# Patient Record
Sex: FEMALE | Race: ASIAN | NOT HISPANIC OR LATINO | Employment: UNEMPLOYED | URBAN - METROPOLITAN AREA
[De-identification: names, ages, dates, MRNs, and addresses within clinical notes are randomized per-mention and may not be internally consistent; named-entity substitution may affect disease eponyms.]

---
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
## 2020-02-13 ENCOUNTER — COMMUNICATION - HEALTHEAST (OUTPATIENT)
Dept: FAMILY MEDICINE | Facility: CLINIC | Age: 20
End: 2020-02-13

## 2020-03-05 ENCOUNTER — MYC REFILL (OUTPATIENT)
Dept: FAMILY MEDICINE | Facility: CLINIC | Age: 20
End: 2020-03-05

## 2020-03-05 DIAGNOSIS — N97.0 ANOVULATION: ICD-10-CM

## 2020-03-09 ENCOUNTER — OFFICE VISIT (OUTPATIENT)
Dept: PSYCHOLOGY | Facility: CLINIC | Age: 20
End: 2020-03-09
Payer: COMMERCIAL

## 2020-03-09 ENCOUNTER — OFFICE VISIT (OUTPATIENT)
Dept: FAMILY MEDICINE | Facility: CLINIC | Age: 20
End: 2020-03-09
Payer: COMMERCIAL

## 2020-03-09 VITALS
RESPIRATION RATE: 18 BRPM | SYSTOLIC BLOOD PRESSURE: 104 MMHG | DIASTOLIC BLOOD PRESSURE: 69 MMHG | OXYGEN SATURATION: 99 % | BODY MASS INDEX: 33.79 KG/M2 | TEMPERATURE: 98.9 F | WEIGHT: 179 LBS | HEART RATE: 70 BPM | HEIGHT: 61 IN

## 2020-03-09 DIAGNOSIS — E66.811 CLASS 1 OBESITY DUE TO EXCESS CALORIES WITHOUT SERIOUS COMORBIDITY WITH BODY MASS INDEX (BMI) OF 33.0 TO 33.9 IN ADULT: ICD-10-CM

## 2020-03-09 DIAGNOSIS — E66.09 CLASS 1 OBESITY DUE TO EXCESS CALORIES WITHOUT SERIOUS COMORBIDITY WITH BODY MASS INDEX (BMI) OF 33.0 TO 33.9 IN ADULT: ICD-10-CM

## 2020-03-09 DIAGNOSIS — F41.8 OTHER SPECIFIED ANXIETY DISORDERS: Primary | ICD-10-CM

## 2020-03-09 DIAGNOSIS — E11.9 TYPE 2 DIABETES MELLITUS WITHOUT COMPLICATION, WITHOUT LONG-TERM CURRENT USE OF INSULIN (H): Primary | ICD-10-CM

## 2020-03-09 DIAGNOSIS — F41.8 OTHER SPECIFIED ANXIETY DISORDERS: ICD-10-CM

## 2020-03-09 LAB — HBA1C MFR BLD: 5.7 % (ref 4.1–5.7)

## 2020-03-09 RX ORDER — NORGESTIMATE AND ETHINYL ESTRADIOL 7DAYSX3 LO
1 KIT ORAL DAILY
Qty: 90 TABLET | Refills: 1 | Status: SHIPPED | OUTPATIENT
Start: 2020-03-09 | End: 2020-10-07

## 2020-03-09 ASSESSMENT — MIFFLIN-ST. JEOR: SCORE: 1524.32

## 2020-03-09 NOTE — RESULT ENCOUNTER NOTE
Released in [employer]:  Bee,  Your A1c is stable, so we can hold off on restarting the metformin for now. Keep up the exercise and weight loss! Good luck the rest of the semester.  -Dr. Montiel

## 2020-03-09 NOTE — PROGRESS NOTES
Behavioral Health Progress Note    Client Legal Name: Jackelin Quinones   Client Preferred Name: Jackelin   Service Type: Individual  Length of Visit: 55 minutes  Attendees: n/a       Identifying Information and Presenting Problem:    The patient is a 19 year old Haylee female who is being seen for problematic symptoms of anxiety.    Treatment Objective(s) Addressed in This Session:  Anxiety: will develop more effective coping skills to manage anxiety symptoms      Progress on / Status of Treatment Objective(s) / Homework:  Satisfactory progress       PHQ-9 SCORE 3/15/2019   PHQ-9 Total Score 13       KEY-7 SCORE 3/15/2019   Total Score 11       Topics Discussed/Interventions Provided:  Patient reports she had a panic attack approx 2 weekends ago at college. She is back in town on spring break and wanted to process through it with me to get idea on how to handle the after effects. Patient has not had an attack since last summer and reports having adjusted to college life well; describes a good social group, doing well in classes, and finding a good schedule for herself. She has implemented some of the anxiety management techniques we discussed in previous appointments (e.g., letting go, prioritizing, progressive muscle relaxation, being social and active, deep breathing) and has not needed to attend psychotherapy at college. Remainder of appointment spent exploring this recent panic attack that seems to have arisen from a number of stressors that piled up quickly. Discussed her management of the attack and answered questions about post attack self-care. I suggested light exercise, prioritizing, self-compassion, and perhaps use of a weighted blanket. Patient unlikely to return to therapy at this time per her report, she simply wanted to process this latest panic attack and plan for the future.     Assessment: The patient appeared to be active and engaged in today's session and was receptive to feedback. Patient's baseline anxiety  seems to have decreased significantly; is proactively engaging in methods to manage her anxiety and has done well in her transition to college. Since this panic attack was the first in at least 6-7 months, patient needed to discuss/some reassurance of how to manage in the future.     Mental Status: Bee appeared generally alert and oriented. Dress was casual and appropriate to the weather and occasion. Grooming and hygiene were clean. Eye contact was good. Speech was of normal volume and rate and was clear, coherent, and relevant. Mood was bright with congruent affect. Thought processes were relevant, logical and goal-directed. Thought content was WNL with no evidence of psychotic or paranoid features. No evidence of SI/HI or self-harm, intent, or plans. Memory appeared grossly intact. Insight and judgment appeared good and patient exhibited good impulse control during the appointment.     Does the patient appear to be at imminent risk of harm to self/others at this time? No        The session was necessary to address anxiety that have been interfering with patient's ability to function at accomplishing tasks on time, procrastinating on work that has led to problems at school, overthinking.  Ongoing psychotherapy is necessary to improve functioning with daily activities, provide psychoeducation and provide support.     Diagnosis (DSM-5):  300.09 other specified anxiety disorder    Plan:  1. Follow up PRN  2. Treatment plan not updated today as patient does not anticipate further appointments with me. If she decides to re-establish care, her treatment plan will need to be reviewed and updated.     NOTE: Treatment plan update due next appt if patient decides to re-establish care Diagnostic assessment update due 4/9/20

## 2020-03-09 NOTE — PROGRESS NOTES
Preceptor Attestation:   Patient seen, evaluated and discussed with the resident. I have verified the content of the note, which accurately reflects my assessment of the patient and the plan of care.   Supervising Physician:  Tucker Sexton MD

## 2020-03-09 NOTE — PROGRESS NOTES
"Assessment and Plan     1. Type 2 diabetes mellitus without complication, without long-term current use of insulin (H)  Sugars have been relatively well controlled per patient report even after stopping metformin - previous A1c 5.6 in October 2019. Stable at 5.7 today! Will continue to hold metformin and encouraged continuing healthy habits as below.   -DM foot exam as below    2. Class 1 obesity due to excess calories without serious comorbidity with body mass index (BMI) of 33.0 to 33.9 in adult  Weight is down 5lbs since I saw her last fall! Applauded her efforts. Encouraged continuing healthy habits. She declines resources.     3. Other specified anxiety disorders  Feels this is well controlled. She seems to understand her provoking factors - stress from exams being one of them so understands she should have a therapist in [place] to help her cope during these times. She declines resources from clinic today. Does not want any medications.     Follow up with [facility] as scheduled, otherwise for physical when return for summer break.  Options for treatment and follow-up care were reviewed with the patient and/or guardian. Jackelin Quinones and/or guardian engaged in the decision making process and verbalized understanding of the options discussed and agreed with the final plan.    Neil Montiel MD   [facility]  Pager #: [phone]    Precepted today with: Dr. 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 SPANGLER later today  Plans to have more regular visits in the summer  She was going to try to find a therapist in [place], but never followed up  School provokes her symptoms  Not interested in medications    Refilled her OCP today on refill encounter    Obesity:  Has been exercising at least 3x per week  Playing a lot of volleyball  Likes the elliptical  Plans to do more cardio            PMHX:     Patient Active Problem List   Diagnosis     Diabetes mellitus, type 2 (H)     Other specified anxiety disorders     Class 1 obesity due to excess calories without serious comorbidity with body mass index (BMI) of 34.0 to 34.9 in adult       Current Outpatient Medications   Medication Sig Dispense Refill     blood glucose (NO BRAND SPECIFIED) lancets standard Use to test blood sugar 1 times daily or as directed. 100 each 11     blood glucose (NO BRAND SPECIFIED) test strip Use to test blood sugar 1 times daily or as directed. 100 strip 11     blood glucose monitoring (NO BRAND SPECIFIED) meter device kit Use to test blood sugar 1 times daily or as directed. 1 kit 0     norgestim-eth estrad triphasic (ORTHO TRI-CYCLEN LO) 0.18/0.215/0.25 MG-25 MCG tablet Take 1 tablet by mouth daily 90 tablet 1     metFORMIN (GLUCOPHAGE-XR) 500 MG 24 hr tablet Take 2 tablets (1,000 mg) by mouth daily (with dinner) (Patient not taking: Reported on 3/9/2020) 30 tablet 3       Social History     Tobacco Use     Smoking status: Never Smoker     Smokeless tobacco: Never Used   Substance Use Topics     Alcohol use: No     Alcohol/week: 0.0 standard drinks     Drug use: No          No Known Allergies    No results found for this or any previous visit (from the past 24 hour(s)).         Review of Systems:   C: NEGATIVE for fatigue, unexpected change in weight  E: NEGATIVE for acute vision problems or changes  R: NEGATIVE for significant cough or shortness of breath  CV: NEGATIVE for chest pain, palpitations or new or worsening peripheral edema  P:  "NEGATIVE for changes in mood or affect     Complete ROS negative unless noted above or in HPI       Physical Exam:     Vitals:    03/09/20 1303   BP: 104/69   Pulse: 70   Resp: 18   Temp: 98.9  F (37.2  C)   TempSrc: Oral   SpO2: 99%   Weight: 81.2 kg (179 lb)   Height: 1.549 m (5' 1\")     Body mass index is 33.82 kg/m .    GENERAL APPEARANCE: alert and no acute distress  PSYCH: mentation appears normal and affect normal/bright  RESP: lungs clear to auscultation - no rales, rhonchi or wheezes  CV: regular rate and rhythm, normal S1 S2, no S3 or S4 and no murmur, click or rub -  EXT: no cyanosis or edema in lower extremities  SKIN: no venous stasis changes    Diabetic Foot Screen:  Any complaints of increased pain or numbness ? No  Is there a foot ulcer now or a history of foot ulcer? No  Does the foot have an abnormal shape? No  Are the nails thick, too long or ingrown? No  Are there any redness or open areas? No         Sensation Testing done at all points on the diagram with monofilament     Right Foot: Sensation Normal at all points  Left Foot: Sensation Normal at all points     Risk Category: 0- No loss of protective sensation  Performed by Neil Montiel MD                    "

## 2020-07-02 ENCOUNTER — COMMUNICATION - HEALTHEAST (OUTPATIENT)
Dept: FAMILY MEDICINE | Facility: CLINIC | Age: 20
End: 2020-07-02

## 2020-07-02 RX ORDER — VIT B COMP NO.3/FOLIC/C/BIOTIN 1 MG-60 MG
1 TABLET ORAL DAILY
Status: SHIPPED | COMMUNITY
Start: 2020-07-02 | End: 2022-06-22

## 2020-07-02 RX ORDER — FERROUS SULFATE 325(65) MG
1 TABLET ORAL WEEKLY
Status: SHIPPED | COMMUNITY
Start: 2020-07-02 | End: 2022-06-22

## 2020-07-03 ENCOUNTER — OFFICE VISIT - HEALTHEAST (OUTPATIENT)
Dept: FAMILY MEDICINE | Facility: CLINIC | Age: 20
End: 2020-07-03

## 2020-07-03 DIAGNOSIS — Z00.129 ENCOUNTER FOR ROUTINE CHILD HEALTH EXAMINATION WITHOUT ABNORMAL FINDINGS: ICD-10-CM

## 2020-07-03 DIAGNOSIS — Z11.3 ROUTINE SCREENING FOR STI (SEXUALLY TRANSMITTED INFECTION): ICD-10-CM

## 2020-07-03 LAB — HIV 1+2 AB+HIV1 P24 AG SERPL QL IA: NEGATIVE

## 2020-07-03 ASSESSMENT — MIFFLIN-ST. JEOR: SCORE: 1060.33

## 2020-07-07 LAB
C TRACH DNA SPEC QL PROBE+SIG AMP: NEGATIVE
N GONORRHOEA DNA SPEC QL NAA+PROBE: NEGATIVE

## 2020-11-29 ENCOUNTER — HEALTH MAINTENANCE LETTER (OUTPATIENT)
Age: 20
End: 2020-11-29

## 2021-01-20 ENCOUNTER — OFFICE VISIT (OUTPATIENT)
Dept: FAMILY MEDICINE | Facility: CLINIC | Age: 21
End: 2021-01-20
Payer: COMMERCIAL

## 2021-01-20 ENCOUNTER — MYC MEDICAL ADVICE (OUTPATIENT)
Dept: FAMILY MEDICINE | Facility: CLINIC | Age: 21
End: 2021-01-20

## 2021-01-20 VITALS
OXYGEN SATURATION: 97 % | SYSTOLIC BLOOD PRESSURE: 105 MMHG | WEIGHT: 187 LBS | HEIGHT: 61 IN | TEMPERATURE: 97.9 F | BODY MASS INDEX: 35.3 KG/M2 | DIASTOLIC BLOOD PRESSURE: 72 MMHG | HEART RATE: 68 BPM | RESPIRATION RATE: 16 BRPM

## 2021-01-20 DIAGNOSIS — B37.31 VAGINA, CANDIDIASIS: ICD-10-CM

## 2021-01-20 DIAGNOSIS — Z23 NEED FOR PROPHYLACTIC VACCINATION AND INOCULATION AGAINST INFLUENZA: Primary | ICD-10-CM

## 2021-01-20 LAB
BACTERIA: NORMAL
CLUE CELLS: NORMAL
MOTILE TRICHOMONAS: NEGATIVE
ODOR: NORMAL
PH WET PREP: <4.5 (ref 3.8–4.5)
WBC WET PREP: NORMAL (ref 2–5)
YEAST: PRESENT

## 2021-01-20 PROCEDURE — 90686 IIV4 VACC NO PRSV 0.5 ML IM: CPT | Performed by: STUDENT IN AN ORGANIZED HEALTH CARE EDUCATION/TRAINING PROGRAM

## 2021-01-20 PROCEDURE — 90471 IMMUNIZATION ADMIN: CPT | Performed by: STUDENT IN AN ORGANIZED HEALTH CARE EDUCATION/TRAINING PROGRAM

## 2021-01-20 PROCEDURE — 99213 OFFICE O/P EST LOW 20 MIN: CPT | Mod: GC | Performed by: STUDENT IN AN ORGANIZED HEALTH CARE EDUCATION/TRAINING PROGRAM

## 2021-01-20 PROCEDURE — 87210 SMEAR WET MOUNT SALINE/INK: CPT | Performed by: STUDENT IN AN ORGANIZED HEALTH CARE EDUCATION/TRAINING PROGRAM

## 2021-01-20 ASSESSMENT — MIFFLIN-ST. JEOR: SCORE: 1555.61

## 2021-01-20 NOTE — PROGRESS NOTES
HPI:       Jackelin Quinones is a 20 year old  female with PMH of diabetes and stopped taking metformin due to side effects who presents for vaginal pruritis and white cottage cheese-like discharge.     Onset: 1-2 weeks  Palliative/provoking: irritated after worse underwear that was too tight.   Taken any medications: Vaginal cream that didn't help. Tried Monistat 7 and it helped relieve the itchiness but got chills. Checked temp with a thermometer and was normal.  Quality: itchy  Radiating: None   Severity: Worsening  Timing: Constant  Other associated features: Discharge thick and white. Cottage cheese. Odorous, fishy smell. No douching.   Sexually active, 2 weeks ago. With one partner, sometimes uses condoms.          PMHX:     Patient Active Problem List   Diagnosis     Diabetes mellitus, type 2 (H)     Other specified anxiety disorders     Class 1 obesity due to excess calories without serious comorbidity with body mass index (BMI) of 33.0 to 33.9 in adult       Current Outpatient Medications   Medication Sig Dispense Refill     norgestim-eth estrad triphasic (ORTHO TRI-CYCLEN LO) 0.18/0.215/0.25 MG-25 MCG tablet Take 1 tablet by mouth daily 90 tablet 1     blood glucose (NO BRAND SPECIFIED) lancets standard Use to test blood sugar 1 times daily or as directed. (Patient not taking: Reported on 1/20/2021) 100 each 11     blood glucose (NO BRAND SPECIFIED) test strip Use to test blood sugar 1 times daily or as directed. (Patient not taking: Reported on 1/20/2021) 100 strip 11     blood glucose monitoring (NO BRAND SPECIFIED) meter device kit Use to test blood sugar 1 times daily or as directed. (Patient not taking: Reported on 1/20/2021) 1 kit 0     metFORMIN (GLUCOPHAGE-XR) 500 MG 24 hr tablet Take 2 tablets (1,000 mg) by mouth daily (with dinner) (Patient not taking: Reported on 3/9/2020) 30 tablet 3      No Known Allergies    No results found for this or any previous visit (from the past 24 hour(s)).          "Review of Systems:     12 point review of systems negative unless stated in HPI           Physical Exam:     Vitals:    01/20/21 1125   BP: 105/72   BP Location: Right arm   Pulse: 68   Resp: 16   Temp: 97.9  F (36.6  C)   TempSrc: Oral   SpO2: 97%   Weight: 84.8 kg (187 lb)   Height: 1.549 m (5' 1\")     Body mass index is 35.33 kg/m .    GENERAL APPEARANCE: healthy, alert and no distress  EYES: Eyes grossly normal to inspection  RESP: lungs clear to auscultation - no rales, rhonchi or wheezes  CV: regular rate and rhythm,  and no murmur, click,  rub or gallop  : - female: cervix- normal, adnexae- normal; uterus- normal, no masses, copious white discharge.  MS: extremities normal- no gross deformities noted  SKIN: no suspicious lesions or rashes  NEURO: Normal strength and tone, sensory exam grossly normal, mentation appears intact and speech normal  PSYCH: mood and affect normal/bright      Assessment and Plan       1. Vagina, candidiasis  Patient with copious white discharge and endorses pruritis. Wet prep showing yeast present. Will order diflucan. Clue cells also present but <20%, does not meet Amsel criteria for BV.  - Wet Prep ([facility])  - fluconazole (DIFLUCAN) 150 MG tablet; Take 1 tablet (150 mg) by mouth once for 1 dose  Dispense: 1 tablet; Refill: 0    2. Need for prophylactic vaccination and inoculation against influenza  - INFLUENZA VACCINE IM > 6 MONTHS VALENT IIV4 [23121]    Encouraged patient to make an appointment to discuss DM as she has stopped taking her metformin.    Options for treatment and follow-up care were reviewed with the patient and/or guardian. Pt and/or guardian engaged in the decision making process and verbalized understanding of the options discussed and agreed with the final plan.    Precepted today with: MD Anne Zavala MD  [facility] Family Medicine Resident PGY-2  [facility]  Pager: [phone]      "

## 2021-01-21 NOTE — TELEPHONE ENCOUNTER
[facility] Family Medicine phone call message- medication clarification/question:    Full Medication Name:    Strength:     Have you contacted your pharmacy about this refill request?     If  Yes,  which pharmacy?    When did you contact the pharmacy?    Additional comments/concerns from call to pharmacy:    Reason for call to clinic: Patient is calling wondering if she is suppose to get a medication from her lab results? Please call and advise.       Pharmacy confirmed as [id number] - [place], [place] - [address] AT [facility]: Yes    OK to leave a message on voice mail?     Primary language: English      needed? No    Call taken on January 21, 2021 at 1:29 PM by Edouard Diehl

## 2021-01-22 RX ORDER — FLUCONAZOLE 150 MG/1
150 TABLET ORAL ONCE
Qty: 1 TABLET | Refills: 0 | Status: SHIPPED | OUTPATIENT
Start: 2021-01-22 | End: 2021-01-22

## 2021-01-22 NOTE — RESULT ENCOUNTER NOTE
Please call patient with the following:    Petey Benítez,     You labs showed that you do have a yeast infection. I sent in a prescription for an antifungal. Please call the the clinic if you have any questions. If your symptoms do not improve or get worse, please make another appointment.     Dr. Marquez

## 2021-01-25 ENCOUNTER — OFFICE VISIT (OUTPATIENT)
Dept: FAMILY MEDICINE | Facility: CLINIC | Age: 21
End: 2021-01-25
Payer: COMMERCIAL

## 2021-01-25 VITALS
HEIGHT: 62 IN | HEART RATE: 79 BPM | BODY MASS INDEX: 34.23 KG/M2 | SYSTOLIC BLOOD PRESSURE: 109 MMHG | RESPIRATION RATE: 20 BRPM | DIASTOLIC BLOOD PRESSURE: 69 MMHG | TEMPERATURE: 98.2 F | WEIGHT: 186 LBS | OXYGEN SATURATION: 96 %

## 2021-01-25 DIAGNOSIS — E11.9 TYPE 2 DIABETES MELLITUS WITHOUT COMPLICATION, WITHOUT LONG-TERM CURRENT USE OF INSULIN (H): Primary | ICD-10-CM

## 2021-01-25 LAB
BUN SERPL-MCNC: 15 MG/DL (ref 5–24)
CALCIUM SERPL-MCNC: 9.8 MG/DL (ref 8.5–10.4)
CHLORIDE SERPLBLD-SCNC: 110 MMOL/L (ref 94–109)
CO2 SERPL-SCNC: 23 MMOL/L (ref 20–32)
CREAT SERPL-MCNC: 0.6 MG/DL (ref 0.6–1.3)
EGFR CALCULATED (BLACK REFERENCE): >90 ML/MIN
EGFR CALCULATED (NON BLACK REFERENCE): >90 ML/MIN
GLUCOSE SERPL-MCNC: 99 MG/DL (ref 60–109)
HBA1C MFR BLD: 5.7 % (ref 4.1–5.7)
POTASSIUM SERPL-SCNC: 3.9 MMOL/L (ref 3.4–5.3)
SODIUM SERPL-SCNC: 142 MMOL/L (ref 133–144)

## 2021-01-25 PROCEDURE — 36415 COLL VENOUS BLD VENIPUNCTURE: CPT | Performed by: STUDENT IN AN ORGANIZED HEALTH CARE EDUCATION/TRAINING PROGRAM

## 2021-01-25 PROCEDURE — 83036 HEMOGLOBIN GLYCOSYLATED A1C: CPT | Performed by: STUDENT IN AN ORGANIZED HEALTH CARE EDUCATION/TRAINING PROGRAM

## 2021-01-25 PROCEDURE — 99213 OFFICE O/P EST LOW 20 MIN: CPT | Mod: GC | Performed by: STUDENT IN AN ORGANIZED HEALTH CARE EDUCATION/TRAINING PROGRAM

## 2021-01-25 PROCEDURE — 80048 BASIC METABOLIC PNL TOTAL CA: CPT | Performed by: STUDENT IN AN ORGANIZED HEALTH CARE EDUCATION/TRAINING PROGRAM

## 2021-01-25 ASSESSMENT — MIFFLIN-ST. JEOR: SCORE: 1563.94

## 2021-01-25 NOTE — PROGRESS NOTES
Preceptor Attestation:  Patient's case reviewed and discussed with Anne Marquez MD resident and I evaluated the patient. I agree with written assessment and plan of care.  Supervising Physician:  Andrew Ivory MD, MD [facility]  [facility]

## 2021-01-25 NOTE — RESULT ENCOUNTER NOTE
Results discussed directly with patient while patient was present. Any further details documented in the note.   Savanah Ferris MD

## 2021-01-25 NOTE — PROGRESS NOTES
HPI:  Jackelin Quinones is a 20-year-old female with past medical history significant for DM2 and obesity who presents today for followup of diabetes mellitus.      - In second year at [employer].   - Lives at home and completes virtual classes.     DM Follow-Up  - Patient was last evaluated 3/9/20. She had been off Metformin at that time, but A1c was stable at 5.7%. Advised to stay off Metformin and continue diet/exercise changes.   - Having side effects of abdominal cramping, nausea, headaches, and fatigue with Metformin. Was on it for 6 months- 1 year.   - Current regimen: Diet controlled.   - Diet: Tried Keto for a day and felt it was too restrictive. Avoiding soda, drinking water, no alcohol. Craves carbonation- so planning to try Serge. Going back to greens. Feels confident in making those changes.   - Exercise: Starting 2 days of exercise weekly. Getting back into slowly. Exercising at home and uses [employer] on [employer].   - A1c today: 5.7%  - BG values: Checks 1-2 times monthly for the past two months. Checks midday after eating. Ranging .   - Foot exam: Completed 3/9/20 without abnormality.   - Eye exam: Due for eye exam.   -Last Urine Microalbumin: Last checked 3/18/19 and negative.   -Preventative Meds:    ASA - Not indicated.    ACEI/ARB - Not indicated.    Statin - No. Lipids last checked 3/15/19 at 109.   -Hypoglycemia sx: Denies headaches, lightheadedness, confusion, diaphoresis, tremor, palpitations  -Hyperglycemia sx: Denies fever/chills, polyuria, polydipsia, polyphagia, abdominal pain    ROS:  C: NEGATIVE for fatigue, unexpected change in weight  E: NEGATIVE for acute vision problems or changes  R: NEGATIVE for significant cough or shortness of breath  CV: NEGATIVE for chest pain, palpitations or new or worsening peripheral edema  P: NEGATIVE for changes in mood or affect    PAST MEDICAL HISTORY:  Patient Active Problem List   Diagnosis     Diabetes mellitus, type 2 (H)     Other specified  anxiety disorders     Class 1 obesity due to excess calories without serious comorbidity with body mass index (BMI) of 33.0 to 33.9 in adult     Past Medical History:   Diagnosis Date     NO ACTIVE PROBLEMS        CURRENT MEDICATIONS:  Current Outpatient Medications   Medication Sig Dispense Refill     blood glucose (NO BRAND SPECIFIED) lancets standard Use to test blood sugar 1 times daily or as directed. 100 each 11     blood glucose (NO BRAND SPECIFIED) test strip Use to test blood sugar 1 times daily or as directed. 100 strip 11     blood glucose monitoring (NO BRAND SPECIFIED) meter device kit Use to test blood sugar 1 times daily or as directed. 1 kit 0     norgestim-eth estrad triphasic (ORTHO TRI-CYCLEN LO) 0.18/0.215/0.25 MG-25 MCG tablet Take 1 tablet by mouth daily 90 tablet 1     metFORMIN (GLUCOPHAGE-XR) 500 MG 24 hr tablet Take 2 tablets (1,000 mg) by mouth daily (with dinner) (Patient not taking: Reported on 3/9/2020) 30 tablet 3       Allergies:   No Known Allergies    RECENT LABS:  Wt Readings from Last 3 Encounters:   01/25/21 84.4 kg (186 lb)   01/20/21 84.8 kg (187 lb)   03/09/20 81.2 kg (179 lb) (95 %, Z= 1.60)*     * Growth percentiles are based on CDC (Girls, 2-20 Years) data.     Last BP:   BP Readings from Last 3 Encounters:   01/25/21 109/69   01/20/21 105/72   03/09/20 104/69     Last A1C:   Lab Results   Component Value Date    A1C 5.7 03/09/2020    A1C 5.6 10/24/2019    A1C 5.7 06/10/2019     Recent Labs   Lab Test 03/15/19  1506   CHOL 216.0*   HDL 39.0*   .0*   TRIG 344.0*   CHOLHDLRATIO 5.6*     Last Microalbumin: No results found for: MICROALBUMIN    The ASCVD Risk score ([place] EMERY Jr., et al., 2013) failed to calculate for the following reasons:    The 2013 ASCVD risk score is only valid for ages 40 to 79     EXAM:  Vitals: /69 (BP Location: Right arm, Patient Position: Sitting, Cuff Size: Adult Regular)   Pulse 79   Temp 98.2  F (36.8  C) (Oral)   Resp 20   Ht 1.57 m  "(5' 1.81\")   Wt 84.4 kg (186 lb)   LMP 01/22/2021   SpO2 96%   BMI 34.23 kg/m    BMI= Body mass index is 34.23 kg/m .  GENERAL APPEARANCE: alert and no acute distress  PSYCH: mentation appears normal and affect normal/bright  RESP: lungs clear to auscultation - no rales, rhonchi or wheezes  CV: regular rate and rhythm, normal S1 S2, and no murmur, click or rub   EXT: no cyanosis or edema in lower extremities  SKIN: no venous stasis changes    ASSESSMENT/PLAN:    1) Diabetes Mellitus Type II: A1c stable at 5.7%. Will continue with lifestyle control and not restart Metformin at this time. Patient is motivated to continue healthy diet/exercise and has plan for both. Advised her to call to clinic if she runs into obstacles or feels that her efforts are not working. Otherwise return in 3 months for A1c recheck. Encouraged her to schedule for annual eye exam.       Follow-up in 3 months for A1c recheck.    Options for treatment and follow-up care were reviewed with the patient and/or guardian. Pt and/or guardian engaged in the decision making process and verbalized understanding of the options discussed and agreed with the final plan.    Janny Mims, MS3    The medical student acted as a scribe and the encounter documented above was performed completely by me and the documentation accurately reflects the work I have performed today. MD Savanah Mohr MD   [facility] Resident   Pager: [phone]    Precepted with: Kulwinder Saldana MD    "

## 2021-05-21 ENCOUNTER — OFFICE VISIT (OUTPATIENT)
Dept: FAMILY MEDICINE | Facility: CLINIC | Age: 21
End: 2021-05-21
Payer: COMMERCIAL

## 2021-05-21 VITALS
TEMPERATURE: 98.3 F | HEIGHT: 61 IN | BODY MASS INDEX: 36.27 KG/M2 | OXYGEN SATURATION: 99 % | RESPIRATION RATE: 16 BRPM | WEIGHT: 192.12 LBS | SYSTOLIC BLOOD PRESSURE: 102 MMHG | DIASTOLIC BLOOD PRESSURE: 73 MMHG | HEART RATE: 72 BPM

## 2021-05-21 DIAGNOSIS — N89.8 VAGINAL DISCHARGE: Primary | ICD-10-CM

## 2021-05-21 DIAGNOSIS — N97.0 ANOVULATION: ICD-10-CM

## 2021-05-21 DIAGNOSIS — E11.65 TYPE 2 DIABETES MELLITUS WITH HYPERGLYCEMIA, WITHOUT LONG-TERM CURRENT USE OF INSULIN (H): ICD-10-CM

## 2021-05-21 DIAGNOSIS — B37.31 YEAST INFECTION OF THE VAGINA: ICD-10-CM

## 2021-05-21 DIAGNOSIS — Z11.3 ROUTINE SCREENING FOR STI (SEXUALLY TRANSMITTED INFECTION): ICD-10-CM

## 2021-05-21 LAB
BACTERIA: NORMAL
CLUE CELLS: NORMAL
HCG UR QL: NEGATIVE
MOTILE TRICHOMONAS: NEGATIVE
ODOR: NORMAL
PH WET PREP: NORMAL (ref 3.8–4.5)
WBC WET PREP: NORMAL (ref 2–5)
YEAST: PRESENT

## 2021-05-21 PROCEDURE — 99214 OFFICE O/P EST MOD 30 MIN: CPT | Performed by: FAMILY MEDICINE

## 2021-05-21 PROCEDURE — 87210 SMEAR WET MOUNT SALINE/INK: CPT | Performed by: FAMILY MEDICINE

## 2021-05-21 PROCEDURE — 81025 URINE PREGNANCY TEST: CPT | Performed by: FAMILY MEDICINE

## 2021-05-21 RX ORDER — FLUCONAZOLE 150 MG/1
150 TABLET ORAL EVERY OTHER DAY
Qty: 3 TABLET | Refills: 0 | Status: SHIPPED | OUTPATIENT
Start: 2021-05-21 | End: 2021-05-26

## 2021-05-21 RX ORDER — NORGESTIMATE AND ETHINYL ESTRADIOL 7DAYSX3 LO
1 KIT ORAL DAILY
Qty: 90 TABLET | Refills: 3 | Status: SHIPPED | OUTPATIENT
Start: 2021-05-21

## 2021-05-21 ASSESSMENT — MIFFLIN-ST. JEOR: SCORE: 1585.44

## 2021-05-21 NOTE — PROGRESS NOTES
"    Assessment & Plan     Vaginal discharge  - Chlamydia/Gono Amplified ([facility])  - Wet Prep ([facility])    Anovulation  - HCG Qualitative Urine (UPT)  ([facility])  - norgestim-eth estrad triphasic (ORTHO TRI-CYCLEN LO) 0.18/0.215/0.25 MG-25 MCG tablet; Take 1 tablet by mouth daily    Routine screening for STI (sexually transmitted infection)  - Chlamydia/Gono Amplified ([facility])    Type 2 diabetes mellitus with hyperglycemia, without long-term current use of insulin (H)  - Continue keto diet. Recommend weight loss through healthy food choices and activity. Recheck a1C in 7/2021    Yeast infection of the vagina  - fluconazole (DIFLUCAN) 150 MG tablet; Take 1 tablet (150 mg) by mouth every other day for 3 doses             BMI:   Estimated body mass index is 35.81 kg/m  as calculated from the following:    Height as of this encounter: 1.56 m (5' 1.42\").    Weight as of this encounter: 87.1 kg (192 lb 1.9 oz).           No follow-ups on file.    Reina Green DO  [facility]    Noe Benítez is a 20 year old who presents for the following health issues     HPI     1. Vaginal concern: No itching, but has had thick discharge , no itching, recently on vacation in [place] wearing a wet bathing suit.   She is sexually active and has never been screened for GC or chlamydia. She takes a daily OCP, but has been out for the past 2 weeks. Her UPT is negative here. Encouraged condom use and restart OCP.    2.  Diabetes: Patient is not taking medications. She had been on metformin in the past.  Patient is not having issues with low blood sugars. She is not checking her blood sugars routinely, but does have the supplies at home. Patient is not exercising outside of work/usual daily activities . Patient's diet is trying keto, but had a break because was on vacation. Discussed weight graph and trend in upward climb of weight.      Last A1c 5.7% in 1/2021.  A1c goal is < 7.0$.    COVID19 vaccine- " "finished the series yesterday      Objective    /73   Pulse 72   Temp 98.3  F (36.8  C) (Oral)   Resp 16   Ht 1.56 m (5' 1.42\")   Wt 87.1 kg (192 lb 1.9 oz)   SpO2 99%   BMI 35.81 kg/m    Body mass index is 35.81 kg/m .  Physical Exam   GENERAL: healthy, alert and no distress  NECK: no adenopathy, no asymmetry, masses, or scars and thyroid normal to palpation  RESP: lungs clear to auscultation - no rales, rhonchi or wheezes  CV: regular rate and rhythm, normal S1 S2, no S3 or S4, no murmur, click or rub, no peripheral edema and peripheral pulses strong  ABDOMEN: soft, nontender, no hepatosplenomegaly, no masses and bowel sounds normal   (female): normal female external genitalia, normal urethral meatus, vaginal mucosa, thick white discharge noted  MS: no gross musculoskeletal defects noted, no edema    Results for orders placed or performed in visit on 05/21/21 (from the past 24 hour(s))   Wet Prep ([facility])   Result Value Ref Range    Yeast Wet Prep Present none    Motile Trichomonas Wet Prep Negative Negative    Clue Cells Wet Prep Present <20% NONE    WBC WET PREP 5-10 2 - 5    Bacteria Wet Prep Moderate None    pH Wet Prep Not performed 3.8 - 4.5    Odor Wet Prep None NONE   HCG Qualitative Urine (UPT)  ([facility])   Result Value Ref Range    HCG Qual Urine Negative Negative                             "

## 2021-05-27 NOTE — PROGRESS NOTES
Assessment: pt seen today for initial visit. She has been checking her BG at home since diagnosis and brings in BG meter today.   FB  Noon: 109  1pm: 96, 138, 129, 124  4pm: 85, 99  6pm: 93  8pm: 125, 116, 135, 110, 108, 117, 121  10pm: 103, 231 (with pancakes and syrup), 119, 139, 97.   BG are remarkably well controlled given A1c. Pt states her PCP is surprised as well. She has a strong family history of DM so she is familiar with it. Reviewed diagnosis, A1c and BG goals, risk and complications.   Reviewed DM meal plan, cho foods and portions as well as goals. Pt states she has been working hard on trying to eat healthy and feels it is going well so far.   She has also started going to the gym at least 2 days a week. She is a full time student as well. Discussed the benefits of activity and strongly encouraged her to continue to try to increase as this is one of the most helpful things she can do for her DM.   Pt has been taking the metformin for about 3 weeks, she is up to 3 tabs/day right now.     Plan: continue to monitor BG daily, healthy eating and watching portions of cho foods. Also, not going long periods w/o eating. Continue to work on increasing daily activity. Call with any questions/concerns or if BG are above goal, otherwise ok to f/u with PCP.     Subjective and Objective:      Jackelin Quinones is referred by Dr. Kulwinder Saldana - [facility] for Diabetes Education.     No results found for: HGBA1C - A1c done 3/18/19 at 11.7%      Current diabetes medications:  Metformin       Education:     Monitoring   Meter (per above goals): Assessed and Discussed  Monitoring: Assessed, Discussed and Literature provided  BG goals: Discussed and Literature provided    Nutrition Management  Nutrition Management: Assessed, Discussed and Literature provided  Weight: Discussed and Literature provided  Portions/Balance: Assessed, Discussed and Literature provided  Carb ID/Count: Assessed, Discussed and Literature  provided  Label Reading: Assessed, Discussed and Literature provided  Heart Healthy Fats: Assessed, Discussed and Literature provided  Menu Planning: Assessed, Discussed and Literature provided  Dining Out: Assessed, Discussed and Literature provided  Physical Activity: Assessed, Discussed and Literature provided  Medications: Discussed    Diabetes Disease Process: Discussed and Literature provided    Acute Complications: Prevent, Detect, Treat:  Hypoglycemia: Assessed, Discussed and Literature provided  Hyperglycemia: Assessed, Discussed and Literature provided  Sick Days: Discussed and Literature provided  Driving: Not addressed    Chronic Complications  Foot Care:Discussed  Skin Care: Literature provided  Eye: Literature provided  ABC: Assessed, Discussed and Literature provided  Teeth:Literature provided  Goal Setting and Problem Solving: Assessed and Discussed  Barriers: Assessed and Discussed  Psychosocial Adjustments: Assessed, Discussed and Literature provided      Time spent with the patient: 60 minutes for diabetes education and counseling.   Previous Education: no  Visit Type:DSMT  Hours Remaining: DSMT 9 and MNT 3      Prudence Andrade  4/16/2019

## 2021-05-27 NOTE — TELEPHONE ENCOUNTER
External - [facility] - Kulwinder Saldana -   Referring Provider: Kulwinder Saldana   DX: Dm Type 2     Ref./rec. Were received on... 03.25.2019 @ 11:55am (inside DM Fax Folder) records printed and sent to scan.     appt made as...  Date: 4/16/2019 Status: Ana   Time: 11:00 AM Length: 60   Visit Type: CONSULT [[id number]] Copay: $0.00   Provider: Prudence Andrade RN Department: [facility] DIABETES EDUCATION   Referring Provider:   CSN: [id number]   Notes: [facility], Dr Kirby Clark DX: Dm Type 2

## 2021-05-30 VITALS — WEIGHT: 96 LBS | HEIGHT: 58 IN | BODY MASS INDEX: 20.15 KG/M2

## 2021-05-31 VITALS — HEIGHT: 58 IN | WEIGHT: 92.75 LBS | BODY MASS INDEX: 19.47 KG/M2

## 2021-05-31 NOTE — TELEPHONE ENCOUNTER
Mother of pt picked up and said that she will let her daughter know to call us back.  If pt calls back, inform her that she had missed her appt with PCP yesterday and please help her reschedule  Px exam.

## 2021-06-03 NOTE — TELEPHONE ENCOUNTER
LMTCB regarding Macrobid antibiotic and UTI.  It shows intermediate effectiveness.  If patient is still having urinary symptoms, we will need to switch her antibiotic.  If her symptoms are gone, she can continue the antibiotic until it is complete.     If her symptoms are not resolved, please send a message to the [facility] walk-in clinic provider pool.      Xochitl Rodriguez, CNP

## 2021-06-03 NOTE — TELEPHONE ENCOUNTER
Patient Returning Call  Reason for call:  Message from clinic  Information relayed to patient: LMTCB regarding Macrobid antibiotic and UTI.  It shows intermediate effectiveness.  If patient is still having urinary symptoms, we will need to switch her antibiotic.  If her symptoms are gone, she can continue the antibiotic until it is complete.      If her symptoms are not resolved, please send a message to the [facility] walk-in clinic provider pool.       Xochitl Rodriguez CNP    Patient has additional questions:  Yes  If YES, what are your questions/concerns:  Please does not feel any better.  Please send different antibiotic [facility] in [place].  Phone Number [phone]  Okay to leave a detailed message?: No call back needed

## 2021-06-03 NOTE — TELEPHONE ENCOUNTER
Patient notified of information and instructions as per Xochitl Rodriguez CNP. Patient verbalized understanding and thanked for the assistance and return call.

## 2021-06-03 NOTE — TELEPHONE ENCOUNTER
Bactrim sent to patient's pharmacy [facility].  Should take 1 dose today and 2 doses starting tomorrow. Xochitl Rodriguez, CNP

## 2021-06-04 VITALS
WEIGHT: 86 LBS | HEART RATE: 83 BPM | TEMPERATURE: 98.7 F | RESPIRATION RATE: 20 BRPM | BODY MASS INDEX: 17.34 KG/M2 | OXYGEN SATURATION: 98 % | HEIGHT: 59 IN | DIASTOLIC BLOOD PRESSURE: 76 MMHG | SYSTOLIC BLOOD PRESSURE: 100 MMHG

## 2021-06-04 VITALS
OXYGEN SATURATION: 98 % | DIASTOLIC BLOOD PRESSURE: 72 MMHG | HEART RATE: 64 BPM | BODY MASS INDEX: 17.96 KG/M2 | TEMPERATURE: 98.5 F | RESPIRATION RATE: 18 BRPM | WEIGHT: 87.3 LBS | SYSTOLIC BLOOD PRESSURE: 106 MMHG

## 2021-06-06 NOTE — TELEPHONE ENCOUNTER
Left voicemail to call us back, please schedule for physical (due for flu shot) when call back. Thanks.

## 2021-06-06 NOTE — TELEPHONE ENCOUNTER
Who is calling:  patient  Reason for Call:  Patient called back. She is away at college & will call back at a later date to schedule her physical.  Date of last appointment with primary care: 1/15/18  Okay to leave a detailed message: Yes, not needed at this time.

## 2021-06-08 NOTE — PROGRESS NOTES
[facility] Well Child Check    ASSESSMENT & PLAN  Demario Sierra is a 16  y.o. 0  m.o. who has normal growth and normal development.    Diagnoses and all orders for this visit:    Encounter for routine child health examination without abnormal findings    Need for vaccination  -     Meningococcal MCV4P  -     HPV vaccine 9 valent 3 dose IM  -     Poliovirus vaccine IPV subq/IM      Return to clinic in 1 year for a Well Child Check or sooner as needed     Sports Px form completed - cleared.    IMMUNIZATIONS/LABS  Immunizations were reviewed and orders were placed as appropriate.     Immunization History   Administered Date(s) Administered     DTaP, historic 03/12/2001, 05/07/2001, 06/25/2001, 03/20/2002, 05/22/2006     HPV 9 Valent 01/16/2017     HPV Quadrivalent 02/06/2015, 06/29/2015     Hep A, historic 08/15/2013     Hep B, historic 03/12/2001, 05/07/2001, 03/20/2002     Hepatitis A, Ped/adol 2 Dose 02/06/2015     HiB, historic 03/12/2001, 05/07/2001, 03/20/2002     History of Varicella/chicken Pox 12/27/2001     IPV 03/12/2001, 05/07/2001, 06/25/2001, 01/16/2017     MMR 01/04/2002, 05/22/2006, 08/15/2013     Meningococcal MCV4P 08/15/2013, 01/16/2017     Tdap 08/15/2013         REFERRALS  Dental:  The patient has already established care with a dentist.  Other:  No additional referrals were made at this time.    ANTICIPATORY GUIDANCE  I have reviewed age appropriate anticipatory guidance.    HEALTH HISTORY  Do you have any concerns that you'd like to discuss today?: No concerns   Enjoys drawing and reading.  Irregular menses.      Roomed by: Alejandra AVILA    Accompanied by Mother    Refills needed? No    Do you have any forms that need to be filled out? Yes Sport form       Do you have any significant health concerns in your family history?: No  No family history on file.  Since your last visit, have there been any major changes in your family, such as a move, job change, separation, divorce, or death in the family?:  No    Home  Who lives in your home?:  Parents and sibs  Social History     Social History Narrative     Do you have any trouble with sleep?:  No    Education  What school does your child attend?:  [employer]  What grade is your child in?:  10th  How does the patient perform in school (grades, behavior, attention, homework?: Doing well     Eating  Does patient eat regular meals including fruits and vegatables?:  yes  What is the patient drinking (cow's milk, water, soda, juice, sports drinks, energy drinks, etc)?: cow's milk- 1% and water  Does patient have concerns about body or appearance?:  No    Activities  Does the patient have friends?:  yes  Does the patient get at least one hour of physical activity per day?:  yes  Does the patient have less than 2 hours of screen time per day (aside from homework)?:  yes  What does your child do for exercise?:  Badminton  Does the patient have interest/participate in community activities/volunteers/school sports?:  yes    MENTAL HEALTH SCREENING  PHQ-2 Total Score: 0 (1/16/2017  3:00 PM)  PHQ-2 Total Score: 0 (1/16/2017  3:00 PM)    VISION/HEARING  Vision: Completed. See Results  Hearing:  Completed. See Results     Hearing Screening    125Hz 250Hz 500Hz 1000Hz 2000Hz 3000Hz 4000Hz 6000Hz 8000Hz   Right ear:   25 20 20  20     Left ear:   25 20 20  20        Visual Acuity Screening    Right eye Left eye Both eyes   Without correction: 20/25 20/25 20/32   With correction:          TB Risk Assessment:  The patient and/or parent/guardian answer positive to:  patient and/or parent/guardian answer 'no' to all screening TB questions    Is child seen by dentist?     Yes    There is no problem list on file for this patient.      Drugs  Does the patient use tobacco/alcohol/drugs?:  no    Safety  Does the patient have any safety concerns (peer or home)?:  no  Does the patient use safety belts, helmets and other safety equipment?:  yes    Sex  Is the patient sexually active?:   "no    MEASUREMENTS  Height:  4' 10.43\" (1.484 m)  Weight: 96 lb (43.5 kg)  BMI: Body mass index is 19.77 kg/(m^2).  Blood Pressure: (!) 90/60  Blood pressure percentiles are 4 % systolic and 33 % diastolic based on NHBPEP's 4th Report. Blood pressure percentile targets: 90: 122/79, 95: 125/83, 99 + 5 mmH/95.    PHYSICAL EXAM  Physical Exam  Eyes: EOM full, pupils normal, conjunctivae normal  Ears: TM's and canals normal  Oropharynx: normal  Neck: supple without adenopathy or thyromegaly  Lungs: normal  Heart: regular rhythm, normal rate, no murmur  Abdomen: no HSM, mass or tenderness  Extremities: FROM, normal strength and sensation  Spine normal  "

## 2021-06-15 NOTE — PROGRESS NOTES
[facility] Well Child Check    ASSESSMENT & PLAN  Demario Sierra is a 17  y.o. 0  m.o. who has normal growth and normal development.    Diagnoses and all orders for this visit:    Encounter for routine child health examination without abnormal findings    Need for vaccination  -     Influenza, Seasonal,Quad Inj, 36+ MOS      Return to clinic in 1 year for a Well Child Check or sooner as needed    IMMUNIZATIONS/LABS  Immunizations were reviewed and orders were placed as appropriate.     Immunization History   Administered Date(s) Administered     DTaP, historic 03/12/2001, 05/07/2001, 06/25/2001, 03/20/2002, 05/22/2006     HPV 9 Valent 01/16/2017     HPV Quadrivalent 02/06/2015, 06/29/2015     Hep A, historic 08/15/2013     Hep B, historic 03/12/2001, 05/07/2001, 03/20/2002     Hepatitis A, Ped/Adol 2 Dose IM (18yr & under) 02/06/2015     HiB, historic,unspecified 03/12/2001, 05/07/2001, 03/20/2002     History of Varicella/chicken Pox 12/27/2001     IPV 03/12/2001, 05/07/2001, 06/25/2001, 01/16/2017     Influenza, seasonal,quad inj 36+ mos 01/15/2018     MMR 01/04/2002, 05/22/2006, 08/15/2013     Meningococcal MCV4P 08/15/2013, 01/16/2017     Tdap 08/15/2013         REFERRALS  Dental:  The patient has already established care with a dentist.  Other:  No additional referrals were made at this time.    ANTICIPATORY GUIDANCE  I have reviewed age appropriate anticipatory guidance.    HEALTH HISTORY  Do you have any concerns that you'd like to discuss today?: No concerns       Roomed by: Alejandra Medellin.    Refills needed? No    Do you have any forms that need to be filled out? No        Do you have any significant health concerns in your family history?: No  No family history on file.  Since your last visit, have there been any major changes in your family, such as a move, job change, separation, divorce, or death in the family?: No  Has a lack of transportation kept you from medical appointments?: N / A.    Home  Who lives  in your home?:  Parents, and 3 siblings.  Social History     Social History Narrative     Do you have any concerns about losing your housing?: No  Is your housing safe and comfortable?: Yes  Do you have any trouble with sleep?:  No    Education  What school do you child attend?:  [employer]  What grade are you in?:  11th  How do you perform in school (grades, behavior, attention, homework?: Very Good     Eating  Do you eat regular meals including fruits and vegetables?:  yes  What are you drinking (cow's milk, water, soda, juice, sports drinks, energy drinks, etc)?: Soy Milk., water, juice.  Have you been worried that you don't have enough food?: No  Do you have concerns about your body or appearance?:  No    Activities  Do you have friends?:  yes  Do you get at least one hour of physical activity per day?:  yes  How many hours a day are you in front of a screen other than for schoolwork (computer, TV, phone)?:  5  What do you do for exercise?:  Leg  ecxercise.  Do you have interest/participate in community activities/volunteers/school sports?:  yes    MENTAL HEALTH SCREENING  No Data Recorded  No Data Recorded    VISION/HEARING  Vision: Completed. See Results  Hearing:  Completed. See Results     Hearing Screening    125Hz 250Hz 500Hz 1000Hz 2000Hz 3000Hz 4000Hz 6000Hz 8000Hz   Right ear:   20 20 20  20     Left ear:   20 20 20  20        Visual Acuity Screening    Right eye Left eye Both eyes   Without correction:      With correction: 20/20 20/20 20/20       TB Risk Assessment:  The patient and/or parent/guardian answer positive to:  parents born outside of the [place]    Dyslipidemia Risk Screening  Have either of your parents or any of your grandparents had a stroke or heart attack before age 55?: No  Any parents with high cholesterol or currently taking medications to treat?: No     Dental  When was the last time you saw the dentist?: 6-12 months ago   Flouride Varnish Application Screening :  Yes  There is no  "problem list on file for this patient.      Drugs  Does the patient use tobacco/alcohol/drugs?:  no    Safety  Does the patient have any safety concerns (peer or home)?:  no  Does the patient use safety belts, helmets and other safety equipment?:  yes    Sex  Have you ever had sex?:  No    MEASUREMENTS  Height:  4' 10.47\" (1.485 m)  Weight: (!) 92 lb 12 oz (42.1 kg)  BMI: Body mass index is 19.08 kg/(m^2).  Blood Pressure: 100/66  Blood pressure percentiles are 21 % systolic and 54 % diastolic based on NHBPEP's 4th Report. Blood pressure percentile targets: 90: 122/79, 95: 126/83, 99 + 5 mmH/95.    PHYSICAL EXAM  Physical Exam   Eyes: EOM full, pupils normal, conjunctivae normal  Ears: TM's and canals normal  Oropharynx: normal  Neck: supple without adenopathy or thyromegaly  Lungs: normal  Heart: regular rhythm, normal rate, no murmur  Abdomen: no HSM, mass or tenderness  Extremities: FROM, normal strength and sensation  Spine normal    "

## 2021-06-17 NOTE — PATIENT INSTRUCTIONS - HE
Patient Instructions by Jon Fajardo PA-C at 12/1/2019 10:40 AM     Author: Jon aFjardo PA-C Service: -- Author Type: Physician Assistant    Filed: 12/1/2019 11:15 AM Encounter Date: 12/1/2019 Status: Addendum    : Jon Fajardo PA-C (Physician Assistant)    Related Notes: Original Note by Jon Fajardo PA-C (Physician Assistant) filed at 12/1/2019 11:14 AM       Increased fluids and rest.  Discussed signs and symptoms of ascending urinary tract infection symptoms to include pyelonephritis. Instructed to turn to clinic if there are increased fever chills night sweats fatigue abdominal pain or flank pain  Antibiotic as written. Risks and benefits of medication discussed.  Indication for return to clinic.    As a result of our visit today, here are the action plans for you:    1. Medication(s) to stop: There are no discontinued medications.    2. Medication(s) to start or change:   Medications Ordered   Medications   ? nitrofurantoin, macrocrystal-monohydrate, (MACROBID) 100 MG capsule     Sig: Take 1 capsule (100 mg total) by mouth 2 (two) times a day for 7 days.     Dispense:  14 capsule     Refill:  0       3. Other instructions: Yes      Urinary Tract Infections in Women    Urinary tract infections (UTIs) are most often caused by bacteria (germs). These bacteria enter the urinary tract. The bacteria may come from outside the body. Or they may travel from the skin outside the rectum or vagina into the urethra. Female anatomy makes it easier for bacteria from the bowel to enter a womans urinary tract, which is the most common source of UTI. This means women develop UTIs more often than men. Pain in or around the urinary tract is a common UTI symptom. But the only way to know for sure if you have a UTI for the health care provider to test your urine. The two tests that may be done are the urinalysis and urine culture.  Types of UTIs    Cystitis: A bladder infection (cystitis) is the most common UTI in women.  You may have urgent or frequent urination. You may also have pain, burning when you urinate, and bloody urine.    Urethritis: This is an inflamed urethra, which is the tube that carries urine from the bladder to outside the body. You may have lower stomach or back pain. You may also have urgent or frequent urination.    Pyelonephritis: This is a kidney infection. If not treated, it can be serious and damage your kidneys. In severe cases, you may be hospitalized. You may have a fever and lower back pain.  Medications to treat a UTI  Most UTIs are treated with antibiotics. These kill the bacteria. The length of time you need to take them depends on the type of infection. It may be as short as 3 days. If you have repeated UTIs, a low-dose antibiotic may be needed for several months. Take antibiotics exactly as directed. Dont stop taking them until all of the medication is gone. If you stop taking the antibiotic too soon, the infection may not go away, and you may develop a resistance to the antibiotic. This can make it much harder to treat.  Lifestyle changes to treat and prevent UTIs  The lifestyle changes below will help get rid of your UTI. They may also help prevent future UTIs.    Drink plenty of fluids. This includes water, juice, or other caffeine-free drinks. Fluids help flush bacteria out of your body.    Empty your bladder. Always empty your bladder when you feel the urge to urinate. And always urinate before going to sleep. Urine that stays in your bladder can lead to infection. Try to urinate before and after sex as well.    Practice good personal hygiene. Wipe yourself from front to back after using the toilet. This helps keep bacteria from getting into the urethra.    Use condoms during sex. These help prevent UTIs caused by sexually transmitted bacteria. Also, avoid using spermicides during sex. These can increase the risk of UTIs. Choose other forms of birth control instead. For women who tend to get  UTIs after sex, a low-dose of a preventive antibiotic may be used. Be sure to discuss this option with your health care provider.    Follow up with your health care provider as directed. He or she may test to make sure the infection has cleared. If necessary, additional treatment may be started.  Date Last Reviewed: 9/8/2014 2000-2016 [employer]. [address] [place] [address]. All rights reserved. This information is not intended as a substitute for professional medical care. Always follow your healthcare professional's instructions.

## 2021-06-18 NOTE — PATIENT INSTRUCTIONS - HE
Patient Instructions by Cary Alva CMA at 7/3/2020  9:00 AM     Author: Cary Alva CMA Service: -- Author Type: Certified Medical Assistant    Filed: 7/3/2020  9:11 AM Encounter Date: 7/3/2020 Status: Signed    : Cary Alva CMA (Certified Medical Assistant)          Patient Education      BRIGHT [facility] HANDOUT- PATIENT  18 THROUGH 21 YEAR VISITS  Here are some suggestions from [employer] experts that may be of value to your family.     HOW YOU ARE DOING  Enjoy spending time with your family.  Find activities you are really interested in, such as sports, theater, or volunteering.  Try to be responsible for your schoolwork or work obligations.  Always talk through problems and never use violence.  If you get angry with someone, try to walk away.  If you feel unsafe in your home or have been hurt by someone, let us know. Hotlines and community agencies can also provide confidential help.  Talk with us if you are worried about your living or food situation. Community agencies and programs such as SNAP can help.  Dont smoke, vape, or use drugs. Avoid people who do when you can. Talk with us if you are worried about alcohol or drug use in your family.    YOUR DAILY LIFE  Visit the dentist at least twice a year.  Brush your teeth at least twice a day and floss once a day.  Be a healthy eater.  Have vegetables, fruits, lean protein, and whole grains at meals and snacks.  Limit fatty, sugary, salty foods that are low in nutrients, such as candy, chips, and ice cream.  Eat when youre hungry. Stop when you feel satisfied.  Eat breakfast.  Drink plenty of water.  Make sure to get enough calcium every day.  Have 3 or more servings of low-fat (1%) or fat-free milk and other low-fat dairy products, such as yogurt and cheese.  Women: Make sure to eat foods rich in folate, such as fortified grains and dark- green leafy vegetables.  Aim for at least 1 hour of physical activity every day.  Wear safety equipment when you play  sports.  Get enough sleep.  Talk with us about managing your health care and insurance as an adult.    YOUR FEELINGS  Most people have ups and downs. If you are feeling sad, depressed, nervous, irritable, hopeless, or angry, let us know or reach out to another health care professional.  Figure out healthy ways to deal with stress.  Try your best to solve problems and make decisions on your own.  Sexuality is an important part of your life. If you have any questions or concerns, we are here for you.    HEALTHY BEHAVIOR CHOICES  Avoid using drugs, alcohol, tobacco, steroids, and diet pills. Support friends who choose not to use.  If you use drugs or alcohol, let us know or talk with another trusted adult about it. We can help you with quitting or cutting down on your use.  Make healthy decisions about your sexual behavior.  If you are sexually active, always practice safe sex. Always use birth control along with a condom to prevent pregnancy and sexually transmitted infections.  All sexual activity should be something you want. No one should ever force or try to convince you.  Protect your hearing at work, home, and concerts. Keep your earbud volume down.    STAYING SAFE  Always be a safe and cautious .  Insist that everyone use a lap and shoulder seat belt.  Limit the number of friends in the car and avoid driving at night.  Avoid distractions. Never text or talk on the phone while you drive.  Do not ride in a vehicle with someone who has been using drugs or alcohol.  If you feel unsafe driving or riding with someone, call someone you trust to drive you.  Wear helmets and protective gear while playing sports. Wear a helmet when riding a bike, a motorcycle, or an ATV or when skiing or skateboarding.  Always use sunscreen and a hat when youre outside.  Fighting and carrying weapons can be dangerous. Talk with your parents, teachers, or doctor about how to avoid these situations.      Helpful Resources:  National  Domestic Violence Hotline: 835.298.6625   Consistent with Bright Futures: Guidelines for Health Supervision of Infants, Children, and Adolescents, 4th Edition  For more information, go to https://brightfutures.aap.org.

## 2021-06-28 NOTE — PROGRESS NOTES
Progress Notes by Jon Fajardo PA-C at 12/1/2019 10:40 AM     Author: Jon Fajardo PA-C Service: -- Author Type: Physician Assistant    Filed: 12/1/2019  4:02 PM Encounter Date: 12/1/2019 Status: Addendum    : Jon Fajardo PA-C (Physician Assistant)    Related Notes: Original Note by Jon Fajardo PA-C (Physician Assistant) filed at 12/1/2019  3:50 PM       Subjective:      Patient ID: Demario Sierra is a 18 y.o. female.    Chief Complaint:    HPI     Demario Sierra is a 18 y.o. female who presents today complaining of one day history of irritative voiding symptoms to include urinary hesitancy urgency frequency and dysuria.  Patient denies gross hematuria.  Denies fever chills night sweats fatigue or other red flag symptoms to include back or flank pain and no vaginal discharge.        No past medical history on file.    No past surgical history on file.    No family history on file.    Social History     Tobacco Use   ? Smoking status: Never Smoker   ? Smokeless tobacco: Never Used   Substance Use Topics   ? Alcohol use: No   ? Drug use: No       Review of Systems  As above in HPI, otherwise balance of Review of Systems are negative.    Objective:     /72   Pulse 64   Temp 98.5  F (36.9  C) (Oral)   Resp 18   Wt (!) 87 lb 4.8 oz (39.6 kg)   SpO2 98%     Physical Exam  Constitutional:       General: She is not in acute distress.     Appearance: She is well-developed. She is not diaphoretic.   HENT:      Head: Normocephalic and atraumatic.      Mouth/Throat:      Pharynx: No oropharyngeal exudate.   Eyes:      General: No scleral icterus.     Pupils: Pupils are equal, round, and reactive to light.   Neck:      Musculoskeletal: Normal range of motion and neck supple.   Cardiovascular:      Rate and Rhythm: Normal rate and regular rhythm.   Pulmonary:      Effort: Pulmonary effort is normal.      Breath sounds: Normal breath sounds.   Abdominal:      Palpations: Abdomen is soft. There is no mass.       Tenderness: There is no abdominal tenderness. There is no guarding or rebound.      Comments: No CVA tenderness to percussion  There is suprapubic tenderness to palpation and does reproduce the sense to urinate   Skin:     General: Skin is dry.      Findings: No rash.   Neurological:      Mental Status: She is alert and oriented to person, place, and time.       Lab:  Recent Results (from the past 24 hour(s))   Urinalysis-UC if Indicated   Result Value Ref Range    Color, UA Yellow Colorless, Yellow, Straw, Light Yellow    Clarity, UA Clear Clear    Glucose, UA Negative Negative    Bilirubin, UA Negative Negative    Ketones, UA Negative Negative    Specific Gravity, UA 1.010 1.005 - 1.030    Blood, UA Moderate (!) Negative    pH, UA 6.5 5.0 - 8.0    Protein, UA Negative Negative mg/dL    Urobilinogen, UA 0.2 E.U./dL 0.2 E.U./dL, 1.0 E.U./dL    Nitrite, UA Positive (!) Negative    Leukocytes, UA Small (!) Negative    Bacteria, UA Many (!) None Seen hpf    RBC, UA 3-5 (!) None Seen, 0-2 hpf    WBC, UA 10-25 (!) None Seen, 0-5 hpf    Squam Epithel, UA 0-5 None Seen, 0-5 lpf       Assessment:     Procedures    The primary encounter diagnosis was Urinary tract infection without hematuria, site unspecified. A diagnosis of Dysuria was also pertinent to this visit.    Plan:     1. Urinary tract infection without hematuria, site unspecified  nitrofurantoin, macrocrystal-monohydrate, (MACROBID) 100 MG capsule    DISCONTINUED: nitrofurantoin, macrocrystal-monohydrate, (MACROBID) 100 MG capsule   2. Dysuria  Urinalysis-UC if Indicated    Culture, Urine    nitrofurantoin, macrocrystal-monohydrate, (MACROBID) 100 MG capsule         Patient Instructions     Increased fluids and rest.  Discussed signs and symptoms of ascending urinary tract infection symptoms to include pyelonephritis. Instructed to turn to clinic if there are increased fever chills night sweats fatigue abdominal pain or flank pain  Antibiotic as written. Risks and  benefits of medication discussed.  Indication for return to clinic.    As a result of our visit today, here are the action plans for you:    1. Medication(s) to stop: There are no discontinued medications.    2. Medication(s) to start or change:   Medications Ordered   Medications   ? nitrofurantoin, macrocrystal-monohydrate, (MACROBID) 100 MG capsule     Sig: Take 1 capsule (100 mg total) by mouth 2 (two) times a day for 7 days.     Dispense:  14 capsule     Refill:  0       3. Other instructions: Yes      Urinary Tract Infections in Women    Urinary tract infections (UTIs) are most often caused by bacteria (germs). These bacteria enter the urinary tract. The bacteria may come from outside the body. Or they may travel from the skin outside the rectum or vagina into the urethra. Female anatomy makes it easier for bacteria from the bowel to enter a womans urinary tract, which is the most common source of UTI. This means women develop UTIs more often than men. Pain in or around the urinary tract is a common UTI symptom. But the only way to know for sure if you have a UTI for the health care provider to test your urine. The two tests that may be done are the urinalysis and urine culture.  Types of UTIs    Cystitis: A bladder infection (cystitis) is the most common UTI in women. You may have urgent or frequent urination. You may also have pain, burning when you urinate, and bloody urine.    Urethritis: This is an inflamed urethra, which is the tube that carries urine from the bladder to outside the body. You may have lower stomach or back pain. You may also have urgent or frequent urination.    Pyelonephritis: This is a kidney infection. If not treated, it can be serious and damage your kidneys. In severe cases, you may be hospitalized. You may have a fever and lower back pain.  Medications to treat a UTI  Most UTIs are treated with antibiotics. These kill the bacteria. The length of time you need to take them depends  on the type of infection. It may be as short as 3 days. If you have repeated UTIs, a low-dose antibiotic may be needed for several months. Take antibiotics exactly as directed. Dont stop taking them until all of the medication is gone. If you stop taking the antibiotic too soon, the infection may not go away, and you may develop a resistance to the antibiotic. This can make it much harder to treat.  Lifestyle changes to treat and prevent UTIs  The lifestyle changes below will help get rid of your UTI. They may also help prevent future UTIs.    Drink plenty of fluids. This includes water, juice, or other caffeine-free drinks. Fluids help flush bacteria out of your body.    Empty your bladder. Always empty your bladder when you feel the urge to urinate. And always urinate before going to sleep. Urine that stays in your bladder can lead to infection. Try to urinate before and after sex as well.    Practice good personal hygiene. Wipe yourself from front to back after using the toilet. This helps keep bacteria from getting into the urethra.    Use condoms during sex. These help prevent UTIs caused by sexually transmitted bacteria. Also, avoid using spermicides during sex. These can increase the risk of UTIs. Choose other forms of birth control instead. For women who tend to get UTIs after sex, a low-dose of a preventive antibiotic may be used. Be sure to discuss this option with your health care provider.    Follow up with your health care provider as directed. He or she may test to make sure the infection has cleared. If necessary, additional treatment may be started.  Date Last Reviewed: 9/8/2014 2000-2016 [employer] [address] [place] [address], [place] [address]. All rights reserved. This information is not intended as a substitute for professional medical care. Always follow your healthcare professional's instructions.

## 2021-06-29 NOTE — PROGRESS NOTES
Progress Notes by Solo Santo MD at 7/3/2020  9:00 AM     Author: Solo Santo MD Service: -- Author Type: Physician    Filed: 7/7/2020  8:34 PM Encounter Date: 7/3/2020 Status: Signed    : Solo Santo MD (Physician)       FEMALE PREVENTATIVE EXAM    Assessment and Plan:       Demario was seen today for annual exam.    Encounter for routine child health examination without abnormal findings  -     Hearing Screening  -     Vision Screening  -     PHQ9 Depression Screen    Routine screening for STI (sexually transmitted infection)  -     HIV Antigen/Antibody Screening Cascade  -     Chlamydia trachomatis & Neisseria gonorrhoeae, Amplified Detection    Discussed oral contraceptives for regulating menses.  She declines.    Next follow up:  Return in 1 year (on 7/3/2021) for physical.    Immunization Review  Adult Imm Review: No immunizations due today        Subjective:   Chief Complaint: Demario Sierra is an 19 y.o. female here for a preventative health visit.     HPI: She attends [email or web address].  She is pursuing [employer] or English.      She works out 4 times per week.    She has had one lifetime sexual partner.  He uses condoms.      No vaginal discharge.    She has irregular periods.      Healthy Habits  Are you taking a daily aspirin? No  Do you typically exercising at least 40 min, 3-4 times per week?  Yes- 1 hr 4x a week  Do you usually eat at least 4 servings of fruit and vegetables a day, include whole grains and fiber and avoid regularly eating high fat foods? NO, mostly fruit  Have you had an eye exam in the past two years? Yes  Do you see a dentist twice per year? Yes  Do you have any concerns regarding sleep? No    Safety Screen  If you own firearms, are they secured in a locked gun cabinet or with trigger locks? Yes  Do you feel you are safe where you are living?: Yes (7/3/2020  9:00 AM)  Do you feel you are safe in your relationship(s)?: Yes (7/3/2020  9:00 AM)      Review of Systems:   "Please see above.  The rest of the review of systems are negative for all systems.       Cancer Screening     Patient has no health maintenance due at this time            History     Reviewed By Date/Time Sections Reviewed    Cary Alva CMA 7/3/2020  9:01 AM Tobacco            Objective:   Vital Signs:   Visit Vitals  /76   Pulse 83   Temp 98.7  F (37.1  C) (Oral)   Resp 20   Ht 4' 10.66\" (1.49 m)   Wt (!) 86 lb (39 kg)   LMP 06/20/2020 (Approximate)   SpO2 98%   Breastfeeding No   BMI 17.57 kg/m           PHYSICAL EXAM  Eyes: EOM full, pupils normal, conjunctivae normal  Ears: TM's and canals normal  Oropharynx: normal  Neck: supple without adenopathy or thyromegaly  Lungs: normal  Heart: regular rhythm, normal rate, no murmur  Abdomen: no HSM, mass or tenderness  Extremities: FROM, normal strength and sensation             Medication List          Accurate as of July 3, 2020 11:59 PM. If you have any questions, ask your nurse or doctor.            CONTINUE taking these medications    ferrous sulfate 65 mg elemental iron  INSTRUCTIONS:  Take 1 tablet by mouth once a week.        vit B comp no.3-folic-C-biotin 1- mg-mg-mcg Tab tablet  Also known as:  NEPHRO-TYRESE  INSTRUCTIONS:  Take 1 tablet by mouth daily.               Additional Screenings Completed Today:          "

## 2021-07-03 NOTE — ADDENDUM NOTE
Addendum Note by Michelle Arceo PA-C at 12/1/2019 10:40 AM     Author: Michelle Arceo PA-C Service: -- Author Type: Physician Assistant    Filed: 12/1/2019  4:02 PM Encounter Date: 12/1/2019 Status: Signed    : Michelle Arceo PA-C (Physician Assistant)    Addended by: MICHELLE ARCEO on: 12/1/2019 04:02 PM        Modules accepted: Orders

## 2021-09-19 ENCOUNTER — HEALTH MAINTENANCE LETTER (OUTPATIENT)
Age: 21
End: 2021-09-19

## 2022-01-09 ENCOUNTER — HEALTH MAINTENANCE LETTER (OUTPATIENT)
Age: 22
End: 2022-01-09

## 2022-05-01 ENCOUNTER — HEALTH MAINTENANCE LETTER (OUTPATIENT)
Age: 22
End: 2022-05-01

## 2022-06-22 ENCOUNTER — OFFICE VISIT (OUTPATIENT)
Dept: FAMILY MEDICINE | Facility: CLINIC | Age: 22
End: 2022-06-22
Payer: COMMERCIAL

## 2022-06-22 VITALS
DIASTOLIC BLOOD PRESSURE: 66 MMHG | BODY MASS INDEX: 17.49 KG/M2 | WEIGHT: 86.75 LBS | HEART RATE: 93 BPM | OXYGEN SATURATION: 99 % | HEIGHT: 59 IN | SYSTOLIC BLOOD PRESSURE: 104 MMHG | TEMPERATURE: 98.6 F

## 2022-06-22 DIAGNOSIS — Z11.1 SCREENING EXAMINATION FOR PULMONARY TUBERCULOSIS: ICD-10-CM

## 2022-06-22 DIAGNOSIS — Z00.00 ROUTINE GENERAL MEDICAL EXAMINATION AT A HEALTH CARE FACILITY: Primary | ICD-10-CM

## 2022-06-22 PROCEDURE — 99395 PREV VISIT EST AGE 18-39: CPT | Performed by: FAMILY MEDICINE

## 2022-06-22 PROCEDURE — 36415 COLL VENOUS BLD VENIPUNCTURE: CPT | Performed by: FAMILY MEDICINE

## 2022-06-22 PROCEDURE — 86481 TB AG RESPONSE T-CELL SUSP: CPT | Performed by: FAMILY MEDICINE

## 2022-06-22 ASSESSMENT — ENCOUNTER SYMPTOMS
PALPITATIONS: 0
DIZZINESS: 0
BREAST MASS: 0
COUGH: 0
ARTHRALGIAS: 0
HEARTBURN: 0
HEADACHES: 0
EYE PAIN: 0
FEVER: 0
WEAKNESS: 0
MYALGIAS: 0
SORE THROAT: 0
JOINT SWELLING: 0
ABDOMINAL PAIN: 0
HEMATOCHEZIA: 0
CHILLS: 0
NERVOUS/ANXIOUS: 0
HEMATURIA: 0
NAUSEA: 0
SHORTNESS OF BREATH: 0
DIARRHEA: 0
FREQUENCY: 0
CONSTIPATION: 0
PARESTHESIAS: 0
DYSURIA: 0

## 2022-06-22 NOTE — PROGRESS NOTES
SUBJECTIVE:   CC: Demario Sierra is an 21 year old woman who presents for preventive health visit.       Patient has been advised of split billing requirements and indicates understanding: Yes  Healthy Habits:     Getting at least 3 servings of Calcium per day:  Yes    Bi-annual eye exam:  Yes    Dental care twice a year:  NO    Sleep apnea or symptoms of sleep apnea:  None    Diet:  Regular (no restrictions)    Frequency of exercise:  4-5 days/week    Duration of exercise:  30-45 minutes    Taking medications regularly:  Not Applicable    Medication side effects:  Not applicable    PHQ-2 Total Score: 1    Additional concerns today:  No      Nursing school.    Took oral contraceptive Aug. 2020 to Jan. 2021    Still same partner.        Today's PHQ-2 Score:   PHQ-2 ( 1999 Pfizer) 6/22/2022   Q1: Little interest or pleasure in doing things 1   Q2: Feeling down, depressed or hopeless 0   PHQ-2 Score 1   Q1: Little interest or pleasure in doing things Several days   Q2: Feeling down, depressed or hopeless Not at all   PHQ-2 Score 1       Abuse: Current or Past (Physical, Sexual or Emotional) - No  Do you feel safe in your environment? Yes    Have you ever done Advance Care Planning? (For example, a Health Directive, POLST, or a discussion with a medical provider or your loved ones about your wishes): No, advance care planning information given to patient to review.  Patient declined advance care planning discussion at this time.    Social History     Tobacco Use     Smoking status: Never Smoker     Smokeless tobacco: Never Used     Tobacco comment: no passive exposure   Substance Use Topics     Alcohol use: No     If you drink alcohol do you typically have >3 drinks per day or >7 drinks per week? Not applicable    Alcohol Use 6/22/2022   Prescreen: >3 drinks/day or >7 drinks/week? No   Prescreen: >3 drinks/day or >7 drinks/week? -       Reviewed orders with patient.  Reviewed health maintenance and updated orders  "accordingly - Yes      Breast Cancer Screening:        History of abnormal Pap smear: NO - age 21-29 PAP every 3 years recommended     Reviewed and updated as needed this visit by clinical staff   Tobacco  Allergies  Meds                Reviewed and updated as needed this visit by Provider                       Review of Systems   Constitutional: Negative for chills and fever.   HENT: Negative for congestion, ear pain, hearing loss and sore throat.    Eyes: Negative for pain and visual disturbance.   Respiratory: Negative for cough and shortness of breath.    Cardiovascular: Negative for chest pain, palpitations and peripheral edema.   Gastrointestinal: Negative for abdominal pain, constipation, diarrhea, heartburn, hematochezia and nausea.   Breasts:  Negative for tenderness, breast mass and discharge.   Genitourinary: Negative for dysuria, frequency, genital sores, hematuria, pelvic pain, urgency, vaginal bleeding and vaginal discharge.   Musculoskeletal: Negative for arthralgias, joint swelling and myalgias.   Skin: Negative for rash.   Neurological: Negative for dizziness, weakness, headaches and paresthesias.   Psychiatric/Behavioral: Negative for mood changes. The patient is not nervous/anxious.           OBJECTIVE:   /66 (Cuff Size: Adult Small)   Pulse 93   Temp 98.6  F (37  C)   Ht 1.49 m (4' 10.66\")   Wt 39.3 kg (86 lb 12 oz)   LMP 05/17/2022 (Exact Date)   SpO2 99%   BMI 17.72 kg/m    Physical Exam  Eyes: EOM full, pupils normal, conjunctivae normal, no pallor  Ears: TM's and canals normal  Oropharynx: normal  Neck: supple without adenopathy or thyromegaly  Lungs: normal  Heart: regular rhythm, normal rate, no murmur  Abdomen: no HSM, mass or tenderness  Extremities: FROM, normal strength and sensation          ASSESSMENT/PLAN:   Demario was seen today for physical.    Diagnoses and all orders for this visit:    Routine general medical examination at a health care facility    Screening " "examination for pulmonary tuberculosis  -     Quantiferon TB Gold Plus; Future  -     Quantiferon TB Gold Plus    Other orders  -     REVIEW OF HEALTH MAINTENANCE PROTOCOL ORDERS    She declined pap, Hep C.      Patient has been advised of split billing requirements and indicates understanding: Yes    COUNSELING:  Reviewed preventive health counseling, as reflected in patient instructions       Healthy diet/nutrition    Estimated body mass index is 17.72 kg/m  as calculated from the following:    Height as of this encounter: 1.49 m (4' 10.66\").    Weight as of this encounter: 39.3 kg (86 lb 12 oz).        She reports that she has never smoked. She has never used smokeless tobacco.      Counseling Resources:  ATP IV Guidelines  Pooled Cohorts Equation Calculator  Breast Cancer Risk Calculator  BRCA-Related Cancer Risk Assessment: FHS-7 Tool  FRAX Risk Assessment  ICSI Preventive Guidelines  Dietary Guidelines for Americans, 2010  USDA's MyPlate  ASA Prophylaxis  Lung CA Screening    Solo Santo MD, MD  [facility]  "

## 2022-06-24 LAB
GAMMA INTERFERON BACKGROUND BLD IA-ACNC: 0.17 IU/ML
M TB IFN-G BLD-IMP: NEGATIVE
M TB IFN-G CD4+ BCKGRND COR BLD-ACNC: 9.83 IU/ML
MITOGEN IGNF BCKGRD COR BLD-ACNC: -0.03 IU/ML
MITOGEN IGNF BCKGRD COR BLD-ACNC: 0.03 IU/ML
QUANTIFERON MITOGEN: 10 IU/ML
QUANTIFERON NIL TUBE: 0.17 IU/ML
QUANTIFERON TB1 TUBE: 0.2 IU/ML
QUANTIFERON TB2 TUBE: 0.14

## 2022-07-12 ENCOUNTER — TELEPHONE (OUTPATIENT)
Dept: FAMILY MEDICINE | Facility: CLINIC | Age: 22
End: 2022-07-12

## 2022-07-12 NOTE — TELEPHONE ENCOUNTER
Reason for Call:  Form, our goal is to have forms completed with 72 hours, however, some forms may require a visit or additional information.    Type of letter, form or note:  [facility] physical form    Who is the form from?: Patient    Where did the form come from: Patient or family brought in       What clinic location was the form placed at?: [facility]    Where the form was placed: [employer]    What number is listed as a contact on the form?: [phone]       Additional comments: Pt will  once complete.   [phone]    Call taken on 7/12/2022 at 3:13 PM by Margaret Patel

## 2022-08-18 ENCOUNTER — OFFICE VISIT (OUTPATIENT)
Dept: FAMILY MEDICINE | Facility: CLINIC | Age: 22
End: 2022-08-18
Payer: COMMERCIAL

## 2022-08-18 VITALS
OXYGEN SATURATION: 98 % | WEIGHT: 213 LBS | TEMPERATURE: 97.9 F | RESPIRATION RATE: 20 BRPM | HEART RATE: 65 BPM | BODY MASS INDEX: 39.2 KG/M2 | DIASTOLIC BLOOD PRESSURE: 75 MMHG | SYSTOLIC BLOOD PRESSURE: 110 MMHG | HEIGHT: 62 IN

## 2022-08-18 DIAGNOSIS — Z00.00 ROUTINE GENERAL MEDICAL EXAMINATION AT A HEALTH CARE FACILITY: Primary | ICD-10-CM

## 2022-08-18 DIAGNOSIS — Z30.011 ENCOUNTER FOR INITIAL PRESCRIPTION OF CONTRACEPTIVE PILLS: ICD-10-CM

## 2022-08-18 DIAGNOSIS — E66.01 MORBID OBESITY (H): ICD-10-CM

## 2022-08-18 DIAGNOSIS — F41.8 OTHER SPECIFIED ANXIETY DISORDERS: ICD-10-CM

## 2022-08-18 DIAGNOSIS — E11.65 TYPE 2 DIABETES MELLITUS WITH HYPERGLYCEMIA, WITHOUT LONG-TERM CURRENT USE OF INSULIN (H): ICD-10-CM

## 2022-08-18 LAB
ANION GAP SERPL CALCULATED.3IONS-SCNC: 13 MMOL/L (ref 7–15)
BUN SERPL-MCNC: 11 MG/DL (ref 6–20)
CALCIUM SERPL-MCNC: 9.4 MG/DL (ref 8.6–10)
CHLORIDE SERPL-SCNC: 103 MMOL/L (ref 98–107)
CHOLEST SERPL-MCNC: 256 MG/DL
CREAT SERPL-MCNC: 0.79 MG/DL (ref 0.51–0.95)
DEPRECATED HCO3 PLAS-SCNC: 23 MMOL/L (ref 22–29)
GFR SERPL CREATININE-BSD FRML MDRD: >90 ML/MIN/1.73M2
GLUCOSE SERPL-MCNC: 105 MG/DL (ref 70–99)
HBA1C MFR BLD: 6.2 % (ref 0–5.6)
HDLC SERPL-MCNC: 37 MG/DL
LDLC SERPL CALC-MCNC: 176 MG/DL
NONHDLC SERPL-MCNC: 219 MG/DL
POTASSIUM SERPL-SCNC: 4.1 MMOL/L (ref 3.4–5.3)
SODIUM SERPL-SCNC: 139 MMOL/L (ref 136–145)
TRIGL SERPL-MCNC: 217 MG/DL

## 2022-08-18 PROCEDURE — 83036 HEMOGLOBIN GLYCOSYLATED A1C: CPT | Performed by: STUDENT IN AN ORGANIZED HEALTH CARE EDUCATION/TRAINING PROGRAM

## 2022-08-18 PROCEDURE — 80061 LIPID PANEL: CPT | Performed by: STUDENT IN AN ORGANIZED HEALTH CARE EDUCATION/TRAINING PROGRAM

## 2022-08-18 PROCEDURE — 87389 HIV-1 AG W/HIV-1&-2 AB AG IA: CPT | Performed by: STUDENT IN AN ORGANIZED HEALTH CARE EDUCATION/TRAINING PROGRAM

## 2022-08-18 PROCEDURE — 82043 UR ALBUMIN QUANTITATIVE: CPT | Performed by: STUDENT IN AN ORGANIZED HEALTH CARE EDUCATION/TRAINING PROGRAM

## 2022-08-18 PROCEDURE — 86803 HEPATITIS C AB TEST: CPT | Performed by: STUDENT IN AN ORGANIZED HEALTH CARE EDUCATION/TRAINING PROGRAM

## 2022-08-18 PROCEDURE — 36415 COLL VENOUS BLD VENIPUNCTURE: CPT | Performed by: STUDENT IN AN ORGANIZED HEALTH CARE EDUCATION/TRAINING PROGRAM

## 2022-08-18 PROCEDURE — 80048 BASIC METABOLIC PNL TOTAL CA: CPT | Performed by: STUDENT IN AN ORGANIZED HEALTH CARE EDUCATION/TRAINING PROGRAM

## 2022-08-18 PROCEDURE — G0145 SCR C/V CYTO,THINLAYER,RESCR: HCPCS | Performed by: STUDENT IN AN ORGANIZED HEALTH CARE EDUCATION/TRAINING PROGRAM

## 2022-08-18 PROCEDURE — 99395 PREV VISIT EST AGE 18-39: CPT | Mod: GC | Performed by: STUDENT IN AN ORGANIZED HEALTH CARE EDUCATION/TRAINING PROGRAM

## 2022-08-18 RX ORDER — NORETHINDRONE ACETATE AND ETHINYL ESTRADIOL .02; 1 MG/1; MG/1
1 TABLET ORAL DAILY
Qty: 84 TABLET | Refills: 4 | Status: SHIPPED | OUTPATIENT
Start: 2022-08-18

## 2022-08-18 ASSESSMENT — ENCOUNTER SYMPTOMS
HEARTBURN: 0
FREQUENCY: 0
ABDOMINAL PAIN: 0
DYSURIA: 0
HEMATURIA: 0
BREAST MASS: 0
CHILLS: 0
HEADACHES: 0
PARESTHESIAS: 0
COUGH: 0
WEAKNESS: 0
HEMATOCHEZIA: 0
DIARRHEA: 0
EYE PAIN: 0
DIZZINESS: 0
SHORTNESS OF BREATH: 1
MYALGIAS: 0
FEVER: 0
SORE THROAT: 0
CONSTIPATION: 0
ARTHRALGIAS: 0
NERVOUS/ANXIOUS: 1
PALPITATIONS: 0
JOINT SWELLING: 0
NAUSEA: 0

## 2022-08-18 NOTE — PROGRESS NOTES
SUBJECTIVE:   CC: Jackelin Quinones is an 21 year old woman who presents for preventive health visit.       Patient has been advised of split billing requirements and indicates understanding: Yes  Healthy Habits:     Getting at least 3 servings of Calcium per day:  Yes    Bi-annual eye exam:  NO    Dental care twice a year:  Yes    Sleep apnea or symptoms of sleep apnea:  None    Diet:  Regular (no restrictions)    Frequency of exercise:  4-5 days/week    Duration of exercise:  45-60 minutes    Taking medications regularly:  No    Barriers to taking medications:  Side effects    Medication side effects:  Lightheadedness and Other    PHQ-2 Total Score: 2    Additional concerns today:  Yes    Today's PHQ-2 Score:   PHQ-2 ( 1999 Pfizer) 8/18/2022   Q1: Little interest or pleasure in doing things 1   Q2: Feeling down, depressed or hopeless 1   PHQ-2 Score 2   PHQ-2 Total Score (12-17 Years)- Positive if 3 or more points; Administer PHQ-A if positive -   Q1: Little interest or pleasure in doing things Not at all   Q2: Feeling down, depressed or hopeless Several days   PHQ-2 Score 1     Depressed mood: feels it is more anxiety. Recent move; living with her brother. Would do better on her own.     Abuse: Current or Past (Physical, Sexual or Emotional) - No  Do you feel safe in your environment? Yes      Social History     Tobacco Use     Smoking status: Never Smoker     Smokeless tobacco: Never Used   Substance Use Topics     Alcohol use: No     Alcohol/week: 0.0 standard drinks       Alcohol Use 8/18/2022   Prescreen: >3 drinks/day or >7 drinks/week? No       Reviewed orders with patient.  Reviewed health maintenance and updated orders accordingly - Yes  Lab work is in process    Breast Cancer Screening: Not due.        History of abnormal Pap smear: None. First PAP today.     Reviewed and updated as needed this visit by clinical staff   Tobacco  Allergies  Meds   Med Hx  Surg Hx  Fam Hx  Soc Hx          Reviewed and  "updated as needed this visit by Provider                       Review of Systems   Constitutional: Negative for chills and fever.   HENT: Negative for congestion, ear pain, hearing loss and sore throat.    Eyes: Negative for pain and visual disturbance.   Respiratory: Positive for shortness of breath. Negative for cough.    Cardiovascular: Positive for chest pain. Negative for palpitations and peripheral edema.   Gastrointestinal: Negative for abdominal pain, constipation, diarrhea, heartburn, hematochezia and nausea.   Breasts:  Negative for tenderness, breast mass and discharge.   Genitourinary: Negative for dysuria, frequency, genital sores, hematuria, pelvic pain, urgency, vaginal bleeding and vaginal discharge.   Musculoskeletal: Negative for arthralgias, joint swelling and myalgias.   Skin: Negative for rash.   Neurological: Negative for dizziness, weakness, headaches and paresthesias.   Psychiatric/Behavioral: Negative for mood changes. The patient is nervous/anxious.      Chest paint and shortness of breath--mainly feels it is anxiousness/inactivity     Chest pain lasts 30 seconds, very positional.        OBJECTIVE:   /75   Pulse 65   Temp 97.9  F (36.6  C)   Resp 20   Ht 1.57 m (5' 1.81\")   Wt 96.6 kg (213 lb)   LMP 07/17/2022   SpO2 98%   BMI 39.20 kg/m    Physical Exam  GENERAL: healthy, alert and no distress  EYES: Eyes grossly normal to inspection, PERRL and conjunctivae and sclerae normal  HENT: ear canals and TM's normal, nose and mouth without ulcers or lesions  NECK: no adenopathy, no asymmetry, masses, or scars and thyroid normal to palpation  RESP: lungs clear to auscultation - no rales, rhonchi or wheezes  CV: regular rate and rhythm, normal S1 S2, no S3 or S4, no murmur, click or rub, no peripheral edema and peripheral pulses strong  ABDOMEN: soft, nontender, no hepatosplenomegaly, no masses and bowel sounds normal   (female): normal female external genitalia, normal urethral " "meatus, vaginal mucosa pink, moist, well rugated, and normal cervix/adnexa/uterus without masses or discharge  MS: no gross musculoskeletal defects noted, no edema  SKIN: no suspicious lesions or rashes  NEURO: Normal strength and tone, mentation intact and speech normal  PSYCH: mentation appears normal, affect normal/bright  Diabetic foot exam: normal DP and PT pulses, no trophic changes or ulcerative lesions, normal sensory exam and normal monofilament exam    Diagnostic Test Results:  Labs reviewed in Epic    ASSESSMENT/PLAN:       ICD-10-CM    1. Routine general medical examination at a health care facility  Z00.00 PAP screen reflex to HPV if ASCUS - recommended age 25 - 29 years     Basic metabolic panel     HIV Ag/Ab Screen Cascade     Hemoglobin A1c     Lipid Profile     Hepatitis C antibody     Albumin Random Urine Quantitative with Creat Ratio   2. Type 2 diabetes mellitus with hyperglycemia, without long-term current use of insulin (H)  E11.65 Basic metabolic panel     Hemoglobin A1c     Lipid Profile     Albumin Random Urine Quantitative with Creat Ratio   3. Morbid obesity (H)  E66.01 Basic metabolic panel     Hemoglobin A1c     Lipid Profile     Albumin Random Urine Quantitative with Creat Ratio     Plan for follow up in 1-2 months about obesity, contraception and T2DM; may need to discuss resuming metformin (previously didn't tolerate due to nausea and headaches.    COUNSELING:  Reviewed preventive health counseling, as reflected in patient instructions       Regular exercise       Healthy diet/nutrition       Contraception       Consider Hep C screening for all patients one time for ages 18-79 years       HIV screeninx in teen years, 1x in adult years, and at intervals if high risk    Estimated body mass index is 39.2 kg/m  as calculated from the following:    Height as of this encounter: 1.57 m (5' 1.81\").    Weight as of this encounter: 96.6 kg (213 lb).    Weight management plan: Discussed " healthy diet and exercise guidelines    She reports that she has never smoked. She has never used smokeless tobacco.      Precepted with Dr. Corey.    Batsheva Rice MD  [facility]

## 2022-08-18 NOTE — PROGRESS NOTES
Preceptor Attestation:   Patient seen, evaluated and discussed with the resident. I have verified the content of the note, which accurately reflects my assessment of the patient and the plan of care.    Supervising Physician:Zahira Corey MD    [facility]

## 2022-08-19 LAB
CREAT UR-MCNC: 141 MG/DL
HCV AB SERPL QL IA: NONREACTIVE
HIV 1+2 AB+HIV1 P24 AG SERPL QL IA: NONREACTIVE
MICROALBUMIN UR-MCNC: <12 MG/L
MICROALBUMIN/CREAT UR: NORMAL MG/G{CREAT}

## 2022-08-23 LAB
BKR LAB AP GYN ADEQUACY: NORMAL
BKR LAB AP GYN INTERPRETATION: NORMAL
BKR LAB AP HPV REFLEX: NORMAL
BKR LAB AP LMP: NORMAL
BKR LAB AP PREVIOUS ABNORMAL: NORMAL
PATH REPORT.COMMENTS IMP SPEC: NORMAL
PATH REPORT.COMMENTS IMP SPEC: NORMAL
PATH REPORT.RELEVANT HX SPEC: NORMAL

## 2022-11-21 ENCOUNTER — HEALTH MAINTENANCE LETTER (OUTPATIENT)
Age: 22
End: 2022-11-21

## 2023-03-24 NOTE — TELEPHONE ENCOUNTER
I spoke with patients mother and notified that a referral is not required for behavioral services. Mom was notified when she called the customer service number that they do not assist with the behavioral portion of her insurance. Mom is going to further clarify by calling the # on her card. I did notify we only manage the medical entity of their coverage not the behavioral. I notified an order was placed if she needs to pickup the order. Mom will call back to discuss further if necessary.    Travel screen done.

## 2023-04-16 ENCOUNTER — HEALTH MAINTENANCE LETTER (OUTPATIENT)
Age: 23
End: 2023-04-16

## 2023-04-21 ENCOUNTER — TELEPHONE (OUTPATIENT)
Dept: FAMILY MEDICINE | Facility: CLINIC | Age: 23
End: 2023-04-21
Payer: COMMERCIAL

## 2023-04-21 NOTE — TELEPHONE ENCOUNTER
Patient Quality Outreach    Patient is due for the following:   Cervical Cancer Screening - PAP Needed    Next Steps:   Schedule a office visit for pap smear     Type of outreach:    Phone, spoke to patient/parent. Patient/parent will call back to schedule.      Questions for provider review:    None     Beth Hanson  Chart routed to Care Team.

## 2023-05-25 ENCOUNTER — OFFICE VISIT (OUTPATIENT)
Dept: FAMILY MEDICINE | Facility: CLINIC | Age: 23
End: 2023-05-25
Payer: COMMERCIAL

## 2023-05-25 VITALS
TEMPERATURE: 98.1 F | WEIGHT: 90.3 LBS | DIASTOLIC BLOOD PRESSURE: 84 MMHG | BODY MASS INDEX: 18.96 KG/M2 | RESPIRATION RATE: 22 BRPM | HEIGHT: 58 IN | SYSTOLIC BLOOD PRESSURE: 119 MMHG | HEART RATE: 70 BPM | OXYGEN SATURATION: 96 %

## 2023-05-25 DIAGNOSIS — B35.4 TINEA CORPORIS: Primary | ICD-10-CM

## 2023-05-25 PROCEDURE — 99214 OFFICE O/P EST MOD 30 MIN: CPT | Performed by: FAMILY MEDICINE

## 2023-05-25 RX ORDER — CICLOPIROX OLAMINE 7.7 MG/G
CREAM TOPICAL 2 TIMES DAILY
Qty: 90 G | Refills: 0 | Status: SHIPPED | OUTPATIENT
Start: 2023-05-25 | End: 2023-06-24

## 2023-05-25 RX ORDER — FLUCONAZOLE 200 MG/1
200 TABLET ORAL WEEKLY
Qty: 4 TABLET | Refills: 0 | Status: SHIPPED | OUTPATIENT
Start: 2023-05-25 | End: 2023-06-16

## 2023-05-25 NOTE — PATIENT INSTRUCTIONS
Start fluconazole 1 tablet weekly first dose today for 4 weeks total    Apply ointment; ciclopirox to spots new and old until spots resolve for the next 4 weeks    Take benadryl ( diphenhydramine) at bedtime will help with itching will make you sleepy        Follow up as needed or if your symptoms worsen in any way.     Follow up with your primary care provider or clinic in about  weeks if your symptoms do not improve

## 2023-05-25 NOTE — PROGRESS NOTES
ASSESSMENT/PLAN:      ICD-10-CM    1. Tinea corporis  B35.4 fluconazole (DIFLUCAN) 200 MG tablet     ciclopirox (LOPROX) 0.77 % cream                Reviewed medication instructions and side effects. Follow up if experiences side effects.     I reviewed supportive care, otc meds to use if needed, expected course, and signs of concern.  Follow up as needed or if she does not improve within  1-2 days or if worsens in any way.  Reviewed red flag symptoms and is to go to the ER if experiences any of these.     The use of Dragon/PowerMic dictation services may have been used to construct the content in this note; any grammatical or spelling errors are non-intentional. Please contact the author of this note directly if you are in need of any clarification.              Patient Instructions     Start fluconazole 1 tablet weekly first dose today for 4 weeks total    Apply ointment; ciclopirox to spots new and old until spots resolve for the next 4 weeks    Take benadryl ( diphenhydramine) at bedtime will help with itching will make you sleepy        Follow up as needed or if your symptoms worsen in any way.     Follow up with your primary care provider or clinic in about  weeks if your symptoms do not improve                  Patient presents with:  Derm Problem: Few months ago developed a rash that flares up        Subjective     Demario Sierra is a 22 year old female who presents to clinic today for the following health issues:    HPI       Rash      Duration: ongoing dry patches/rash on lower abdomen resolves with lotion    In last moth, onset of small red spots, initially one started on her arm, now scattered on trunk arms and legs,     Description  Location: trunk and extremities   Itching: moderate    Intensity:  moderate    Accompanying signs and symptoms: lesions have raised borders, mild flaking with central clearing     History (similar episodes/previous evaluation): None    Precipitating or alleviating  "factors:  New exposures:  Uncertain, just finished college school year and lives in a dorm  Recent travel: no      Therapies tried and outcome: moisturizer lotion        No past medical history on file.  Social History     Tobacco Use     Smoking status: Never     Smokeless tobacco: Never     Tobacco comments:     no passive exposure   Substance Use Topics     Alcohol use: No       No current outpatient medications on file.     No Known Allergies          ROS are negative, except as otherwise noted HPI      Objective    /84 (BP Location: Right arm, Patient Position: Sitting, Cuff Size: Adult Regular)   Pulse 70   Temp 98.1  F (36.7  C) (Oral)   Resp 22   Ht 1.473 m (4' 10\")   Wt 41 kg (90 lb 4.8 oz)   LMP 05/17/2023 (Approximate)   SpO2 96%   BMI 18.87 kg/m    Body mass index is 18.87 kg/m .  Physical Exam   GENERAL: healthy, alert and no distress  NECK: no adenopathy, no asymmetry, masses, or scars and thyroid normal to palpation  SKIN: scattered circular lesions over trunk and extremities-new lesions red/pink in color, other lesions over time now with raised borders with scale and central clearing, areas on of excoriation on extremities and trunk  NEURO: Normal strength and tone, mentation intact and speech normal, normal gait      Diagnostic Test Results:  Labs reviewed in Epic  No results found for any visits on 05/25/23.                  "

## 2023-05-26 ENCOUNTER — APPOINTMENT (OUTPATIENT)
Dept: INTERPRETER SERVICES | Facility: CLINIC | Age: 23
End: 2023-05-26
Payer: COMMERCIAL

## 2023-05-26 ENCOUNTER — TELEPHONE (OUTPATIENT)
Dept: FAMILY MEDICINE | Facility: CLINIC | Age: 23
End: 2023-05-26
Payer: COMMERCIAL

## 2023-05-26 NOTE — TELEPHONE ENCOUNTER
Patient Quality Outreach    Patient is due for the following:   Cervical Cancer Screening - PAP Needed    Next Steps:   Schedule a office visit for pap smear     Type of outreach:    Phone, spoke to patient/parent. pt already has an appt with [facility]     Next Steps:  Reach out within 90 days via Phone.    Max number of attempts reached: No. Will try again in 90 days if patient still on fail list.    Questions for provider review:    None           Asia Jamison MA  Chart routed to Care Team.

## 2023-06-26 ENCOUNTER — OFFICE VISIT (OUTPATIENT)
Dept: FAMILY MEDICINE | Facility: CLINIC | Age: 23
End: 2023-06-26
Payer: COMMERCIAL

## 2023-06-26 ENCOUNTER — TRANSFERRED RECORDS (OUTPATIENT)
Dept: FAMILY MEDICINE | Facility: CLINIC | Age: 23
End: 2023-06-26

## 2023-06-26 VITALS
BODY MASS INDEX: 18.89 KG/M2 | TEMPERATURE: 98 F | WEIGHT: 90 LBS | OXYGEN SATURATION: 100 % | HEIGHT: 58 IN | RESPIRATION RATE: 16 BRPM | HEART RATE: 80 BPM | SYSTOLIC BLOOD PRESSURE: 109 MMHG | DIASTOLIC BLOOD PRESSURE: 69 MMHG

## 2023-06-26 DIAGNOSIS — L30.9 DERMATITIS: ICD-10-CM

## 2023-06-26 DIAGNOSIS — Z00.00 ANNUAL PHYSICAL EXAM: Primary | ICD-10-CM

## 2023-06-26 DIAGNOSIS — Z12.4 CERVICAL CANCER SCREENING: ICD-10-CM

## 2023-06-26 PROCEDURE — G0145 SCR C/V CYTO,THINLAYER,RESCR: HCPCS | Performed by: PHYSICIAN ASSISTANT

## 2023-06-26 PROCEDURE — 99213 OFFICE O/P EST LOW 20 MIN: CPT | Mod: 25 | Performed by: PHYSICIAN ASSISTANT

## 2023-06-26 PROCEDURE — 99395 PREV VISIT EST AGE 18-39: CPT | Performed by: PHYSICIAN ASSISTANT

## 2023-06-26 ASSESSMENT — ENCOUNTER SYMPTOMS
HEMATOCHEZIA: 0
CONSTIPATION: 0
HEMATURIA: 0
NERVOUS/ANXIOUS: 0
ABDOMINAL PAIN: 0
NAUSEA: 0
DIARRHEA: 0
PARESTHESIAS: 0
MYALGIAS: 0
SORE THROAT: 0
EYE PAIN: 0
CHILLS: 0
PALPITATIONS: 0
FEVER: 0
COUGH: 0
DYSURIA: 0
BREAST MASS: 0
HEARTBURN: 0
DIZZINESS: 0
JOINT SWELLING: 0
FREQUENCY: 0
HEADACHES: 0
SHORTNESS OF BREATH: 0
ARTHRALGIAS: 0
WEAKNESS: 0

## 2023-06-26 NOTE — PROGRESS NOTES
SUBJECTIVE    Demario Sierra is a 22 year old female who presents for an annual exam.    Other concerns today:  1.  Skin rash  She was seen at the ER a month or so ago and diagnosed with tinea over large areas of her body.  She was given oral fluconazole to take once a week for 4 weeks.  She finished that about a week ago.  She is also given topical ciclopirox to use and finished that right about now.  She notes that overall rash has significantly improved, but now it still remains on her abdomen and her bilateral inner thighs.  Not too itchy.      Immunization History   Administered Date(s) Administered     COVID-19 Bivalent 12+ (Pfizer) 02/02/2023     COVID-19 MONOVALENT 12+ (Pfizer) 05/12/2021, 06/02/2021, 01/19/2022     Comvax (HIB/HepB) 03/20/2002     DTAP (<7y) 03/12/2001, 05/07/2001, 06/25/2001, 03/20/2002, 05/22/2006     HEPATITIS A (PEDS 12M-18Y) 02/06/2015     HIB(PRP-OMP)(PedvaxHIB) 03/12/2001, 05/07/2001     HPV Quadrivalent 02/06/2015, 06/29/2015     HPV9 01/16/2017     HepA, Unspecified 08/15/2013     HepB, Unspecified 03/12/2001, 05/07/2001     Influenza Vaccine >6 months (Alfuria,Fluzone) 10/19/2021, 10/08/2022     Influenza Vaccine, 6+MO IM (QUADRIVALENT W/PRESERVATIVES) 01/15/2018     Influenza, Whole Virus 10/27/2020     MMR 01/04/2002, 05/22/2006, 08/15/2013     Meningococcal ACWY (Menactra ) 08/15/2013, 01/16/2017     Poliovirus, inactivated (IPV) 03/12/2001, 05/07/2001, 06/25/2001, 01/16/2017     TDAP (Adacel,Boostrix) 08/15/2013     Varicella Pt Report Hx of Varicella/Chicken Pox 12/27/2001       Patient's last menstrual period was 05/17/2023 (approximate).  OB History   No obstetric history on file.       No current outpatient medications on file.     No current facility-administered medications for this visit.       History reviewed. No pertinent past medical history.    History reviewed. No pertinent surgical history.     Family History   Problem Relation Age of Onset     No Known Problems  Mother      No Known Problems Father      Eczema Brother      Eczema Brother               6/26/2023    11:01 AM   Additional Questions   Roomed by saundra   Accompanied by self     Healthy Habits:     Getting at least 3 servings of Calcium per day:  Yes    Bi-annual eye exam:  Yes    Dental care twice a year:  NO    Sleep apnea or symptoms of sleep apnea:  None    Diet:  Regular (no restrictions)    Frequency of exercise:  None    Taking medications regularly:  Not Applicable    Medication side effects:  Not applicable    PHQ-2 Total Score: 0    Additional concerns today:  No    Today's PHQ-2 Score:       6/26/2023    10:47 AM   PHQ-2 ( 1999 Pfizer)   Q1: Little interest or pleasure in doing things 0   Q2: Feeling down, depressed or hopeless 0   PHQ-2 Score 0   Q1: Little interest or pleasure in doing things Not at all   Q2: Feeling down, depressed or hopeless Not at all   PHQ-2 Score 0     Social History     Tobacco Use     Smoking status: Never     Smokeless tobacco: Never     Tobacco comments:     no passive exposure   Substance Use Topics     Alcohol use: No           6/26/2023    10:47 AM   Alcohol Use   Prescreen: >3 drinks/day or >7 drinks/week? Not Applicable     Review of Systems   Constitutional: Negative for chills and fever.   HENT: Negative for congestion, ear pain, hearing loss and sore throat.    Eyes: Negative for pain and visual disturbance.   Respiratory: Negative for cough and shortness of breath.    Cardiovascular: Negative for chest pain, palpitations and peripheral edema.   Gastrointestinal: Negative for abdominal pain, constipation, diarrhea, heartburn, hematochezia and nausea.   Breasts:  Negative for tenderness, breast mass and discharge.   Genitourinary: Negative for dysuria, frequency, genital sores, hematuria, pelvic pain, urgency, vaginal bleeding and vaginal discharge.   Musculoskeletal: Negative for arthralgias, joint swelling and myalgias.   Skin: Positive for rash.   Neurological:  "Negative for dizziness, weakness, headaches and paresthesias.   Psychiatric/Behavioral: Negative for mood changes. The patient is not nervous/anxious.          OBJECTIVE    Vitals:    06/26/23 1102   BP: 109/69   BP Location: Left arm   Patient Position: Sitting   Cuff Size: Adult Regular   Pulse: 80   Resp: 16   Temp: 98  F (36.7  C)   TempSrc: Temporal   SpO2: 100%   Weight: 40.8 kg (90 lb)   Height: 1.473 m (4' 10\")       Body mass index is 18.81 kg/m .    Physical Exam:  General: patient is alert and oriented x 3, in no apparent distress  HEENT, Thyroid, Lymphatic, Cardiac, Pulmonary, GI, Musculoskeletal, Skin, and Neuro exams were completed today and grossly normal  Breast exam: declined exam  Genitourinary: External genitalia is normal in appearance, vaginal walls are healthy, cervix is well visualized and normal in appearance, no significant discharge noted, pap taken without difficulty  Skin: Multiple small slightly raised skin colored lesions present on the abdomen and bilateral inner thighs, no vesicles, no pustules, no central clearing or raised borders    Pap pending      ASSESSMENT and PLAN    1. Annual physical exam  Health maintenance is discussed with patient as appropriate for age and risk factors.  She declined offer of birth control.  She declined STD testing.  Pap smear completed today.  She is up-to-date on her other healthcare maintenance.  - Pap screen reflex to HPV if ASCUS - recommend age 25 - 29    2. Dermatitis  She reports going to the ER about a month ago, diagnosed with extensive ringworm on her body.  She took fluconazole oral pills once a week for 4 weeks, finished that within the past week or so.  Also has ciclopirox cream that she is just about finished with.  Rash is significantly improved, but still present on abdomen and in her legs.  Referral to dermatology to review and discuss next steps.  - Adult Dermatology Referral; Future      This dictation uses voice recognition " software, which may contain typographical errors.

## 2023-06-30 LAB
BKR LAB AP GYN ADEQUACY: NORMAL
BKR LAB AP GYN INTERPRETATION: NORMAL
BKR LAB AP HPV REFLEX: NORMAL
BKR LAB AP PREVIOUS ABNORMAL: NORMAL
PATH REPORT.COMMENTS IMP SPEC: NORMAL
PATH REPORT.COMMENTS IMP SPEC: NORMAL
PATH REPORT.RELEVANT HX SPEC: NORMAL

## 2023-07-10 ENCOUNTER — LAB (OUTPATIENT)
Dept: LAB | Facility: CLINIC | Age: 23
End: 2023-07-10
Payer: COMMERCIAL

## 2023-07-10 DIAGNOSIS — Z11.1 SCREENING EXAMINATION FOR PULMONARY TUBERCULOSIS: ICD-10-CM

## 2023-07-10 DIAGNOSIS — Z11.1 SCREENING EXAMINATION FOR PULMONARY TUBERCULOSIS: Primary | ICD-10-CM

## 2023-07-10 PROCEDURE — 36415 COLL VENOUS BLD VENIPUNCTURE: CPT

## 2023-07-10 PROCEDURE — 86481 TB AG RESPONSE T-CELL SUSP: CPT

## 2023-07-11 LAB
QUANTIFERON MITOGEN: 10 IU/ML
QUANTIFERON NIL TUBE: 0.31 IU/ML
QUANTIFERON TB1 TUBE: 0.6 IU/ML
QUANTIFERON TB2 TUBE: 0.41

## 2023-07-12 ENCOUNTER — TELEPHONE (OUTPATIENT)
Dept: FAMILY MEDICINE | Facility: CLINIC | Age: 23
End: 2023-07-12
Payer: COMMERCIAL

## 2023-07-12 LAB
GAMMA INTERFERON BACKGROUND BLD IA-ACNC: 0.31 IU/ML
M TB IFN-G BLD-IMP: NEGATIVE
M TB IFN-G CD4+ BCKGRND COR BLD-ACNC: 9.69 IU/ML
MITOGEN IGNF BCKGRD COR BLD-ACNC: 0.1 IU/ML
MITOGEN IGNF BCKGRD COR BLD-ACNC: 0.29 IU/ML

## 2023-07-12 NOTE — TELEPHONE ENCOUNTER
----- Message from Isidra Grimes MD sent at 7/12/2023  8:44 AM CDT -----  Please inform patient that TB screening test is negative/normal, and ask if she needs results mailed (this was a lab-only, I did not see patient)

## 2023-07-12 NOTE — TELEPHONE ENCOUNTER
Forms/Letter Request    Type of form/letter: TB Results    Have you been seen for this request: No    Do we have the form/letter: Yes: Please mail results to address on file      How would you like the form/letter returned: Mail  Is this the correct address?: Yes  [facility] [place] [address]    Patient Notified form requests are processed in 3-5 business days:Yes    Okay to leave a detailed message?: Yes at Cell number on file:    Telephone Information:   Mobile [phone]

## 2023-08-15 ENCOUNTER — ALLIED HEALTH/NURSE VISIT (OUTPATIENT)
Dept: FAMILY MEDICINE | Facility: CLINIC | Age: 23
End: 2023-08-15
Payer: COMMERCIAL

## 2023-08-15 DIAGNOSIS — Z23 ENCOUNTER FOR IMMUNIZATION: Primary | ICD-10-CM

## 2023-08-15 PROCEDURE — 90471 IMMUNIZATION ADMIN: CPT

## 2023-08-15 PROCEDURE — 99207 PR NO CHARGE NURSE ONLY: CPT

## 2023-08-15 PROCEDURE — 90715 TDAP VACCINE 7 YRS/> IM: CPT

## 2023-08-15 NOTE — PROGRESS NOTES
Prior to immunization administration, verified patients identity using patient s name and date of birth. Please see Immunization Activity for additional information.     Screening Questionnaire for Adult Immunization    Are you sick today?   No   Do you have allergies to medications, food, a vaccine component or latex?   No   Have you ever had a serious reaction after receiving a vaccination?   No   Do you have a long-term health problem with heart, lung, kidney, or metabolic disease (e.g., diabetes), asthma, a blood disorder, no spleen, complement component deficiency, a cochlear implant, or a spinal fluid leak?  Are you on long-term aspirin therapy?   No   Do you have cancer, leukemia, HIV/AIDS, or any other immune system problem?   No   Do you have a parent, brother, or sister with an immune system problem?   No   In the past 3 months, have you taken medications that affect  your immune system, such as prednisone, other steroids, or anticancer drugs; drugs for the treatment of rheumatoid arthritis, Crohn s disease, or psoriasis; or have you had radiation treatments?   No   Have you had a seizure, or a brain or other nervous system problem?   No   During the past year, have you received a transfusion of blood or blood    products, or been given immune (gamma) globulin or antiviral drug?   No   For women: Are you pregnant or is there a chance you could become       pregnant during the next month?   No   Have you received any vaccinations in the past 4 weeks?   No     Immunization questionnaire answers were all negative.    I have reviewed the following standing orders:   This patient is due and qualifies for a TDAP vaccine.    Click here for Tdap Standing Order    I have reviewed the vaccines inclusion and exclusion criteria; No concerns regarding eligibility.     Patient instructed to remain in clinic for 15 minutes afterwards, and to report any adverse reactions.     Screening performed by Sunshine Ruelas on  8/15/2023 at 11:33 AM.

## 2023-08-24 ENCOUNTER — TELEPHONE (OUTPATIENT)
Dept: FAMILY MEDICINE | Facility: CLINIC | Age: 23
End: 2023-08-24

## 2023-08-24 ENCOUNTER — OFFICE VISIT (OUTPATIENT)
Dept: FAMILY MEDICINE | Facility: CLINIC | Age: 23
End: 2023-08-24
Payer: COMMERCIAL

## 2023-08-24 ENCOUNTER — NURSE TRIAGE (OUTPATIENT)
Dept: NURSING | Facility: CLINIC | Age: 23
End: 2023-08-24

## 2023-08-24 VITALS
RESPIRATION RATE: 20 BRPM | OXYGEN SATURATION: 97 % | DIASTOLIC BLOOD PRESSURE: 71 MMHG | TEMPERATURE: 98.3 F | HEART RATE: 75 BPM | WEIGHT: 87.3 LBS | SYSTOLIC BLOOD PRESSURE: 109 MMHG | BODY MASS INDEX: 18.25 KG/M2

## 2023-08-24 DIAGNOSIS — R21 RASH: Primary | ICD-10-CM

## 2023-08-24 LAB
KOH PREPARATION: NORMAL
KOH PREPARATION: NORMAL

## 2023-08-24 PROCEDURE — 87220 TISSUE EXAM FOR FUNGI: CPT | Performed by: NURSE PRACTITIONER

## 2023-08-24 PROCEDURE — 99213 OFFICE O/P EST LOW 20 MIN: CPT | Performed by: NURSE PRACTITIONER

## 2023-08-24 NOTE — PROGRESS NOTES
"Assessment & Plan     Rash    - KOH prep (skin, hair or nails only)     Patient with a history of eczema with a few tiny erythematous nonpruritic rash areas located on right inner thigh and left lateral leg.    KOH to distinguish negative.  Patient did go home after KOH, but did not answer phone for results.  Voice message left.  Did discuss possibilities prior to departure.    Okay to watch for now as areas of concern are very small and non-bothersome versus 1% hydrocortisone to affected areas for possible tiny area of eczema with history of same twice daily for up to 2 weeks per her preference.  Follow-up if worsening.              No follow-ups on file.    Xochitl Rodriguez, [facility] BEVERLY Hanks is a 22 year old female who presents to clinic today for the following health issues:  Chief Complaint   Patient presents with    Rash     Bilateral inner thighs x Monday. Some redness. No itching. No drainage. Possible ringworm; \"have had it before recently\"     Rash  Associated symptoms include a rash.       Patient with a history of eczema with tiny scattered light pink rash areas located on inner thigh and left outer thigh.    Has had similar in the past and she used topical what sounds like hydrocortisone and OTC topical fungal medication which did eventually resolve the rash.    Is wondering if this is ringworm.      Review of Systems   Skin:  Positive for rash.       Denies other symptoms        Objective    /71 (BP Location: Right arm, Patient Position: Sitting, Cuff Size: Adult Small)   Pulse 75   Temp 98.3  F (36.8  C) (Oral)   Resp 20   Wt 39.6 kg (87 lb 4.8 oz)   LMP 07/11/2023 (Within Days)   SpO2 97%   BMI 18.25 kg/m    Physical Exam  Constitutional:       Appearance: Normal appearance.   Cardiovascular:      Pulses: Normal pulses.   Pulmonary:      Effort: Pulmonary effort is normal.   Skin:     Findings: Rash (Tiny, dry, somewhat flaky tiny light pink " rash areas located inner thigh right approximately half centimeter in diameter with similar x2-3 elsewhere on the thighs and legs.) present.      Comments: Rash areas in question have no obvious signs of ringworm such as raised edges.   Neurological:      Mental Status: She is alert.   Psychiatric:         Mood and Affect: Mood normal.            Results for orders placed or performed in visit on 08/24/23 (from the past 24 hour(s))   KOH prep (skin, hair or nails only)    Specimen: Thigh, Right; Skin   Result Value Ref Range    KOH Preparation No fungal elements seen     KOH Preparation Reference Range: No fungal elements seen.

## 2023-08-24 NOTE — TELEPHONE ENCOUNTER
Called patient, no answer. Left brief VM providing call back number. Please relay provider's message regarding NAYAN result.    Constance Ward MA on 8/24/2023 at 3:40 PM

## 2023-08-24 NOTE — TELEPHONE ENCOUNTER
----- Message from Xochitl Rodriguez CNP sent at 8/24/2023  3:30 PM CDT -----  Please call patient and let her know that as discussed, the skin scraping test does not show evidence of ringworm.      She can try 1% hydrocortisone topical available over-the-counter twice daily for up to 2 weeks or until the rash is gone.  Otherwise if it is not bothering her, okay to watch it for now.    Could be a little patch of eczema as discussed.    If the area is worsening, she should see her primary care provider.

## 2023-08-24 NOTE — TELEPHONE ENCOUNTER
Phone call missed earlier while she was at the [facility] today.     ----- Message from Xochitl Rodriguez CNP sent at 8/24/2023  3:30 PM CDT -----  Please call patient and let her know that as discussed, the skin scraping test does not show evidence of ringworm.       She can try 1% hydrocortisone topical available over-the-counter twice daily for up to 2 weeks or until the rash is gone.  Otherwise if it is not bothering her, okay to watch it for now.     Could be a little patch of eczema as discussed.     If the area is worsening, she should see her primary care provider.    This information was given to patient. No further questions at this time.   Tangela Carpenter RN Triage Nurse Advisor 3:51 PM 8/24/2023

## 2023-11-26 ENCOUNTER — HEALTH MAINTENANCE LETTER (OUTPATIENT)
Age: 23
End: 2023-11-26

## 2024-05-28 ENCOUNTER — PATIENT OUTREACH (OUTPATIENT)
Dept: CARE COORDINATION | Facility: CLINIC | Age: 24
End: 2024-05-28
Payer: COMMERCIAL

## 2024-06-11 ENCOUNTER — PATIENT OUTREACH (OUTPATIENT)
Dept: CARE COORDINATION | Facility: CLINIC | Age: 24
End: 2024-06-11
Payer: COMMERCIAL

## 2024-06-23 ENCOUNTER — HEALTH MAINTENANCE LETTER (OUTPATIENT)
Age: 24
End: 2024-06-23

## 2024-07-19 ENCOUNTER — OFFICE VISIT (OUTPATIENT)
Dept: FAMILY MEDICINE | Facility: CLINIC | Age: 24
End: 2024-07-19
Payer: COMMERCIAL

## 2024-07-19 VITALS
HEIGHT: 59 IN | TEMPERATURE: 97.8 F | OXYGEN SATURATION: 99 % | SYSTOLIC BLOOD PRESSURE: 90 MMHG | HEART RATE: 88 BPM | BODY MASS INDEX: 18.42 KG/M2 | DIASTOLIC BLOOD PRESSURE: 56 MMHG | RESPIRATION RATE: 12 BRPM | WEIGHT: 91.38 LBS

## 2024-07-19 DIAGNOSIS — Z11.1 SCREENING EXAMINATION FOR PULMONARY TUBERCULOSIS: Primary | ICD-10-CM

## 2024-07-19 DIAGNOSIS — Z11.59 NEED FOR HEPATITIS C SCREENING TEST: ICD-10-CM

## 2024-07-19 LAB — HCV AB SERPL QL IA: NONREACTIVE

## 2024-07-19 PROCEDURE — 36415 COLL VENOUS BLD VENIPUNCTURE: CPT | Performed by: FAMILY MEDICINE

## 2024-07-19 PROCEDURE — 86803 HEPATITIS C AB TEST: CPT | Performed by: FAMILY MEDICINE

## 2024-07-19 PROCEDURE — 86481 TB AG RESPONSE T-CELL SUSP: CPT | Performed by: FAMILY MEDICINE

## 2024-07-19 PROCEDURE — 99213 OFFICE O/P EST LOW 20 MIN: CPT | Performed by: FAMILY MEDICINE

## 2024-07-19 NOTE — PROGRESS NOTES
"  Assessment & Plan     Screening examination for pulmonary tuberculosis: For employer. Negative symptom screen and normal lung exam. Quant gold today- patient would prefer results either sent via [employer] (she will try to activate) or just call patient with results.  - Quantiferon TB Gold Plus  - Quantiferon TB Gold Plus    Need for hepatitis C screening test  - Hepatitis C Screen Reflex to HCV RNA Quant and Genotype  - Hepatitis C Screen Reflex to HCV RNA Quant and Genotype              Subjective   Demario is a 23 year old, presenting for the following health issues:  Tb test       7/19/2024     9:08 AM   Additional Questions   Roomed by FAVIO Milner   Accompanied by Self     History of Present Illness       Reason for visit:  TB shot for employment    She eats 2-3 servings of fruits and vegetables daily.She consumes 0 sweetened beverage(s) daily.She exercises with enough effort to increase her heart rate 20 to 29 minutes per day.  She exercises with enough effort to increase her heart rate 3 or less days per week.   She is taking medications regularly.       Starting new job as RN  Needs TB testing  No exposure to TB  Denies dyspnea, cough, unintentional weight loss, night sweats, hemoptysis      Objective    BP 90/56 (BP Location: Left arm, Patient Position: Sitting, Cuff Size: Adult Regular)   Pulse 88   Temp 97.8  F (36.6  C) (Oral)   Resp 12   Ht 1.495 m (4' 10.86\")   Wt 41.4 kg (91 lb 6 oz)   LMP 06/14/2024 (Approximate)   SpO2 99%   BMI 18.54 kg/m    Body mass index is 18.54 kg/m .  Physical Exam   Gen: well appearing, no distress  CV: RRR no m/r/g  Resp: CTAB        Signed Electronically by: Lety Hager MD    "

## 2024-07-22 LAB
QUANTIFERON MITOGEN: 10 IU/ML
QUANTIFERON NIL TUBE: 0.19 IU/ML
QUANTIFERON TB1 TUBE: 0.24 IU/ML
QUANTIFERON TB2 TUBE: 0.2

## 2024-07-23 LAB
GAMMA INTERFERON BACKGROUND BLD IA-ACNC: 0.19 IU/ML
M TB IFN-G BLD-IMP: NEGATIVE
M TB IFN-G CD4+ BCKGRND COR BLD-ACNC: 9.81 IU/ML
MITOGEN IGNF BCKGRD COR BLD-ACNC: 0.01 IU/ML
MITOGEN IGNF BCKGRD COR BLD-ACNC: 0.05 IU/ML

## 2024-07-27 ENCOUNTER — HEALTH MAINTENANCE LETTER (OUTPATIENT)
Age: 24
End: 2024-07-27

## 2024-09-22 ENCOUNTER — OFFICE VISIT (OUTPATIENT)
Dept: FAMILY MEDICINE | Facility: CLINIC | Age: 24
End: 2024-09-22
Payer: COMMERCIAL

## 2024-09-22 VITALS
RESPIRATION RATE: 14 BRPM | HEART RATE: 72 BPM | SYSTOLIC BLOOD PRESSURE: 104 MMHG | OXYGEN SATURATION: 96 % | TEMPERATURE: 98.5 F | DIASTOLIC BLOOD PRESSURE: 76 MMHG

## 2024-09-22 DIAGNOSIS — R35.0 URINARY FREQUENCY: Primary | ICD-10-CM

## 2024-09-22 DIAGNOSIS — R30.0 DYSURIA: ICD-10-CM

## 2024-09-22 DIAGNOSIS — N30.00 ACUTE CYSTITIS WITHOUT HEMATURIA: ICD-10-CM

## 2024-09-22 LAB
ALBUMIN UR-MCNC: NEGATIVE MG/DL
APPEARANCE UR: CLEAR
BACTERIA #/AREA URNS HPF: ABNORMAL /HPF
BILIRUB UR QL STRIP: NEGATIVE
COLOR UR AUTO: YELLOW
GLUCOSE UR STRIP-MCNC: NEGATIVE MG/DL
HGB UR QL STRIP: NEGATIVE
KETONES UR STRIP-MCNC: NEGATIVE MG/DL
LEUKOCYTE ESTERASE UR QL STRIP: ABNORMAL
NITRATE UR QL: NEGATIVE
PH UR STRIP: 7 [PH] (ref 5–8)
RBC #/AREA URNS AUTO: ABNORMAL /HPF
SP GR UR STRIP: <=1.005 (ref 1–1.03)
SQUAMOUS #/AREA URNS AUTO: ABNORMAL /LPF
UROBILINOGEN UR STRIP-ACNC: 0.2 E.U./DL
WBC #/AREA URNS AUTO: ABNORMAL /HPF

## 2024-09-22 PROCEDURE — 99213 OFFICE O/P EST LOW 20 MIN: CPT | Performed by: STUDENT IN AN ORGANIZED HEALTH CARE EDUCATION/TRAINING PROGRAM

## 2024-09-22 PROCEDURE — 81001 URINALYSIS AUTO W/SCOPE: CPT | Performed by: STUDENT IN AN ORGANIZED HEALTH CARE EDUCATION/TRAINING PROGRAM

## 2024-09-22 PROCEDURE — 87088 URINE BACTERIA CULTURE: CPT | Performed by: STUDENT IN AN ORGANIZED HEALTH CARE EDUCATION/TRAINING PROGRAM

## 2024-09-22 PROCEDURE — 87086 URINE CULTURE/COLONY COUNT: CPT | Performed by: STUDENT IN AN ORGANIZED HEALTH CARE EDUCATION/TRAINING PROGRAM

## 2024-09-22 RX ORDER — PHENAZOPYRIDINE HYDROCHLORIDE 200 MG/1
200 TABLET, FILM COATED ORAL 3 TIMES DAILY PRN
Qty: 6 TABLET | Refills: 0 | Status: SHIPPED | OUTPATIENT
Start: 2024-09-22 | End: 2024-09-22

## 2024-09-22 RX ORDER — SULFAMETHOXAZOLE/TRIMETHOPRIM 800-160 MG
1 TABLET ORAL 2 TIMES DAILY
Qty: 6 TABLET | Refills: 0 | Status: SHIPPED | OUTPATIENT
Start: 2024-09-22 | End: 2024-09-25

## 2024-09-22 NOTE — PROGRESS NOTES
chief complaint: Dysuria    HPI:  Demario Sierra is a 23 year old female who presents today complaining of 1 week history of increased urinary frequency and urgency.  Has some discomfort with urination but not totally burning.  Denies any vaginal burning, itchiness or discharge.  Patient has a history of recurrent UTI and latest episode was April of this year.  Patient presented with similar symptoms around that time as well.  No fever, chills, flank pain, nausea, vomiting or any other associated symptoms    History obtained from the patient.    Problem List:  There are no relevant problems documented for this patient.      No past medical history on file.    Social History     Tobacco Use    Smoking status: Never     Passive exposure: Never    Smokeless tobacco: Never    Tobacco comments:     no passive exposure   Substance Use Topics    Alcohol use: No       Review of systems  ROS negative except for pertinent positives listed in HPI      Vitals:    09/22/24 1204   BP: 104/76   Pulse: 72   Resp: 14   Temp: 98.5  F (36.9  C)   TempSrc: Oral   SpO2: 96%       Physical Exam  Constitutional: healthy, alert, and no distress  Head: Normocephalic.   Neck: Neck supple. No adenopathy.   Respiratory:unlabored respiratory effort  Gastrointestinal:Sight tenderness with suprapubic palpation, no VA tenderness. Abdomen soft, non-tender.   Musculoskeletal: extremities normal- no gross deformities noted, gait normal  Psychiatric: mentation appears normal and affect normal/bright      Assessment & Plan   Demario was seen today for urinary problem.    Diagnoses and all orders for this visit:    Urinary frequency  Dysuria  Urinalysis showed some leukocyte esterase but no nitrates.  Also microscopic analysis revealed few bacteria and white blood cells in urine.  Patient has typical symptoms of UTI, given consistency with her symptoms and previous urinary tract infections, discussed lab results with patient and options to treat versus holding  off antibiotics at this point.  Patient opted to treat at this point with 3 days course of Bactrim.  Also suggested that patient could hold off on antibiotics treatment instead if symptoms continue to worsen instead.  -     UA Microscopic with Reflex to Culture  -     Discontinue: phenazopyridine (PYRIDIUM) 200 MG tablet; Take 1 tablet (200 mg) by mouth 3 times daily as needed for irritation.  -     sulfamethoxazole-trimethoprim (BACTRIM DS) 800-160 MG tablet; Take 1 tablet by mouth 2 times daily for 3 days.  -     Urine Culture Aerobic Bacterial      At the end of the encounter, I discussed results, diagnosis, medications. Discussed red flags for immediate return to clinic/ER, as well as indications for follow up if no improvement. Patient understood and agreed to plan. Patient was stable for discharge.

## 2024-09-23 ENCOUNTER — TELEPHONE (OUTPATIENT)
Dept: FAMILY MEDICINE | Facility: CLINIC | Age: 24
End: 2024-09-23
Payer: COMMERCIAL

## 2024-09-23 LAB — BACTERIA UR CULT: ABNORMAL

## 2024-09-23 RX ORDER — AMOXICILLIN 500 MG/1
500 CAPSULE ORAL 3 TIMES DAILY
Qty: 15 CAPSULE | Refills: 0 | Status: SHIPPED | OUTPATIENT
Start: 2024-09-23 | End: 2024-09-28

## 2024-09-23 NOTE — TELEPHONE ENCOUNTER
----- Message from Cornel Ko sent at 9/23/2024  3:35 PM CDT -----  Your urine culture grew out a strep bacteria. We should change your antibiotic to amoxicillin 500mg 3 times daily for 5 days. I sent this in to your pharmacy. I will have my nurse try to call you with the results to make sure you get the message. Cornel Ko MD

## 2024-09-23 NOTE — TELEPHONE ENCOUNTER
Called and spoke to patient. Relayed providers message. Patient understood and will  new rx shortly today and stop bactrim. No questions.    Constance Ward MA on 9/23/2024 at 4:02 PM

## 2024-11-23 ENCOUNTER — OFFICE VISIT (OUTPATIENT)
Dept: URGENT CARE | Facility: URGENT CARE | Age: 24
End: 2024-11-23
Payer: COMMERCIAL

## 2024-11-23 VITALS
DIASTOLIC BLOOD PRESSURE: 78 MMHG | HEART RATE: 78 BPM | TEMPERATURE: 98 F | SYSTOLIC BLOOD PRESSURE: 120 MMHG | RESPIRATION RATE: 20 BRPM | OXYGEN SATURATION: 98 %

## 2024-11-23 DIAGNOSIS — R35.0 URINARY FREQUENCY: ICD-10-CM

## 2024-11-23 DIAGNOSIS — B37.31 YEAST INFECTION OF THE VAGINA: Primary | ICD-10-CM

## 2024-11-23 LAB
ALBUMIN UR-MCNC: NEGATIVE MG/DL
APPEARANCE UR: CLEAR
BILIRUB UR QL STRIP: NEGATIVE
CLUE CELLS: ABNORMAL
COLOR UR AUTO: YELLOW
GLUCOSE UR STRIP-MCNC: NEGATIVE MG/DL
HGB UR QL STRIP: NEGATIVE
KETONES UR STRIP-MCNC: NEGATIVE MG/DL
LEUKOCYTE ESTERASE UR QL STRIP: NEGATIVE
NITRATE UR QL: NEGATIVE
PH UR STRIP: 7 [PH] (ref 5–8)
SP GR UR STRIP: 1.01 (ref 1–1.03)
TRICHOMONAS, WET PREP: ABNORMAL
UROBILINOGEN UR STRIP-ACNC: 0.2 E.U./DL
WBC'S/HIGH POWER FIELD, WET PREP: ABNORMAL
YEAST, WET PREP: PRESENT

## 2024-11-23 PROCEDURE — 87210 SMEAR WET MOUNT SALINE/INK: CPT | Performed by: STUDENT IN AN ORGANIZED HEALTH CARE EDUCATION/TRAINING PROGRAM

## 2024-11-23 PROCEDURE — 81003 URINALYSIS AUTO W/O SCOPE: CPT | Performed by: STUDENT IN AN ORGANIZED HEALTH CARE EDUCATION/TRAINING PROGRAM

## 2024-11-23 PROCEDURE — 99213 OFFICE O/P EST LOW 20 MIN: CPT | Performed by: STUDENT IN AN ORGANIZED HEALTH CARE EDUCATION/TRAINING PROGRAM

## 2024-11-23 RX ORDER — FLUCONAZOLE 150 MG/1
150 TABLET ORAL
Qty: 3 TABLET | Refills: 0 | Status: SHIPPED | OUTPATIENT
Start: 2024-11-23 | End: 2024-11-30

## 2024-11-23 NOTE — PROGRESS NOTES
Assessment & Plan     Yeast infection of the vagina  Recently on amoxicillin. Had 2 doses of diflucan and symptoms improved some but not completely. Yeast present on wet prep. Advised treatment with 3 total doses of fluconazole as directed. Follow up if symptoms persist or worsen.    - fluconazole (DIFLUCAN) 150 MG tablet  Dispense: 3 tablet; Refill: 0    Urinary frequency  UA negative.   - UA Macroscopic with reflex to Microscopic and Culture  - Wet preparation         No follow-ups on file.    Joana Hogue, FCO CNP  [facility] URG[facility] [place]    Marcy Hanks is a 23 year old female who presents to clinic today for the following health issues:  Chief Complaint   Patient presents with    Urgent Care     Poss uti and yeast infection   Itchy and pain        HPI      Review of Systems  Constitutional, HEENT, cardiovascular, pulmonary, GI, , musculoskeletal, neuro, skin, endocrine and psych systems are negative, except as otherwise noted.      Objective    /78   Pulse 78   Temp 98  F (36.7  C)   Resp 20   SpO2 98%   Physical Exam   GENERAL: alert and no distress  MS: no gross musculoskeletal defects noted, no edema  SKIN: no suspicious lesions or rashes  NEURO: Normal strength and tone, mentation intact and speech normal  PSYCH: mentation appears normal, affect normal/bright    Results for orders placed or performed in visit on 11/23/24   UA Macroscopic with reflex to Microscopic and Culture     Status: Normal    Specimen: Urine, Clean Catch   Result Value Ref Range    Color Urine Yellow Colorless, Straw, Light Yellow, Yellow    Appearance Urine Clear Clear    Glucose Urine Negative Negative mg/dL    Bilirubin Urine Negative Negative    Ketones Urine Negative Negative mg/dL    Specific Gravity Urine 1.015 1.005 - 1.030    Blood Urine Negative Negative    pH Urine 7.0 5.0 - 8.0    Protein Albumin Urine Negative Negative mg/dL    Urobilinogen Urine 0.2 0.2, 1.0 E.U./dL    Nitrite  Urine Negative Negative    Leukocyte Esterase Urine Negative Negative    Narrative    Microscopic not indicated   Wet preparation     Status: Abnormal    Specimen: Vagina; Swab   Result Value Ref Range    Trichomonas Absent Absent    Yeast Present (A) Absent    Clue Cells Absent Absent    WBCs/high power field 1+ (A) None

## 2024-12-23 ENCOUNTER — PATIENT OUTREACH (OUTPATIENT)
Dept: CARE COORDINATION | Facility: CLINIC | Age: 24
End: 2024-12-23
Payer: COMMERCIAL

## 2024-12-31 NOTE — PATIENT INSTRUCTIONS
Good job on working on your diet and exercise  Come back to clinic in 3 months for repeat labs  Make sure you call to schedule an eye exam    CARE PROVIDERS:  Administration: Nica Azevedo  Admitting: Denzel Pagan  Attending: Denzel Pagan  Case Management: Santaromana, Anna  Consultant: Diony Her  Covering Team: Cruz Morrell  Infection Control: Tosha Mclalister  Nurse: Erin Albright  Nurse: Elaine Cosme  Nurse: Ernesto Vela  Nurse: Dennise Lopes  Override: Ernesto Vela  Override: Kelly Hernandez  PCA/Nursing Assistant: Beatriz Durand  Physical Therapy: Mena Wilde  Primary Team: Mike Charles  Primary Team: Joselo Curtis  Registered Dietitian: Karina Maher  Research: John Purdy  : Viridiana Del Toro// Supp. Assoc.: Liliya Ring

## 2025-01-03 NOTE — ADDENDUM NOTE
Addended by: JAYDA SANDERS on: 7/1/2019 08:28 AM     Modules accepted: Orders     Detail Level: Detailed

## 2025-01-04 ENCOUNTER — HEALTH MAINTENANCE LETTER (OUTPATIENT)
Age: 25
End: 2025-01-04

## 2025-05-12 ENCOUNTER — TELEPHONE (OUTPATIENT)
Dept: FAMILY MEDICINE | Facility: CLINIC | Age: 25
End: 2025-05-12
Payer: COMMERCIAL

## 2025-05-12 DIAGNOSIS — Z86.19 HISTORY OF HERPES ZOSTER VIRUS: Primary | ICD-10-CM

## 2025-05-12 NOTE — TELEPHONE ENCOUNTER
Lab only appointment requesting this titer, unsure of original request but patient would like completed.

## 2025-05-15 ENCOUNTER — LAB (OUTPATIENT)
Dept: LAB | Facility: CLINIC | Age: 25
End: 2025-05-15
Payer: COMMERCIAL

## 2025-05-15 DIAGNOSIS — Z86.19 HISTORY OF HERPES ZOSTER VIRUS: Primary | ICD-10-CM

## 2025-05-16 ENCOUNTER — RESULTS FOLLOW-UP (OUTPATIENT)
Dept: FAMILY MEDICINE | Facility: CLINIC | Age: 25
End: 2025-05-16

## 2025-07-12 ENCOUNTER — HEALTH MAINTENANCE LETTER (OUTPATIENT)
Age: 25
End: 2025-07-12

## 2025-07-23 ENCOUNTER — ALLIED HEALTH/NURSE VISIT (OUTPATIENT)
Dept: FAMILY MEDICINE | Facility: CLINIC | Age: 25
End: 2025-07-23
Payer: COMMERCIAL

## 2025-07-23 DIAGNOSIS — Z23 ENCOUNTER FOR IMMUNIZATION: Primary | ICD-10-CM

## 2025-07-23 PROCEDURE — 99207 PR NO CHARGE NURSE ONLY: CPT

## 2025-07-23 PROCEDURE — 90716 VAR VACCINE LIVE SUBQ: CPT

## 2025-07-23 PROCEDURE — 90471 IMMUNIZATION ADMIN: CPT

## 2025-07-23 NOTE — PROGRESS NOTES
Prior to immunization administration, verified patients identity using patient s name and date of birth. Please see Immunization Activity for additional information.     Screening Questionnaire for Adult Immunization    Are you sick today?   No   Do you have allergies to medications, food, a vaccine component or latex?   No   Have you ever had a serious reaction after receiving a vaccination?   No   Do you have a long-term health problem with heart, lung, kidney, or metabolic disease (e.g., diabetes), asthma, a blood disorder, no spleen, complement component deficiency, a cochlear implant, or a spinal fluid leak?  Are you on long-term aspirin therapy?   No   Do you have cancer, leukemia, HIV/AIDS, or any other immune system problem?   No   Do you have a parent, brother, or sister with an immune system problem?   No   In the past 3 months, have you taken medications that affect  your immune system, such as prednisone, other steroids, or anticancer drugs; drugs for the treatment of rheumatoid arthritis, Crohn s disease, or psoriasis; or have you had radiation treatments?   No   Have you had a seizure, or a brain or other nervous system problem?   No   During the past year, have you received a transfusion of blood or blood    products, or been given immune (gamma) globulin or antiviral drug?   No   For women: Are you pregnant or is there a chance you could become       pregnant during the next month?   No   Have you received any vaccinations in the past 4 weeks?   No     Immunization questionnaire answers were all negative.    I have reviewed the following standing orders:   This patient is due and qualifies for the Varicella vaccine.    Click here for Varicella (Adult) Standing Order    I have reviewed the vaccines inclusion and exclusion criteria; No concerns regarding eligibility.     Patient instructed to remain in clinic for 15 minutes afterwards, and to report any adverse reactions.     Screening performed by Shaheen  NIURKA Waldron on 7/23/2025 at 10:26 AM.

## 2025-07-30 ENCOUNTER — OFFICE VISIT (OUTPATIENT)
Dept: URGENT CARE | Facility: URGENT CARE | Age: 25
End: 2025-07-30
Payer: COMMERCIAL

## 2025-07-30 VITALS
OXYGEN SATURATION: 99 % | SYSTOLIC BLOOD PRESSURE: 135 MMHG | TEMPERATURE: 98.4 F | HEART RATE: 95 BPM | WEIGHT: 87.8 LBS | RESPIRATION RATE: 18 BRPM | BODY MASS INDEX: 17.82 KG/M2 | DIASTOLIC BLOOD PRESSURE: 85 MMHG

## 2025-07-30 DIAGNOSIS — N76.0 BACTERIAL VAGINOSIS: Primary | ICD-10-CM

## 2025-07-30 DIAGNOSIS — R35.0 INCREASED FREQUENCY OF URINATION: ICD-10-CM

## 2025-07-30 DIAGNOSIS — B96.89 BACTERIAL VAGINOSIS: Primary | ICD-10-CM

## 2025-07-30 LAB
ALBUMIN UR-MCNC: NEGATIVE MG/DL
APPEARANCE UR: CLEAR
BACTERIA #/AREA URNS HPF: ABNORMAL /HPF
BILIRUB UR QL STRIP: NEGATIVE
C TRACH DNA SPEC QL PROBE+SIG AMP: NEGATIVE
CLUE CELLS: PRESENT
COLOR UR AUTO: YELLOW
GLUCOSE UR STRIP-MCNC: NEGATIVE MG/DL
HGB UR QL STRIP: ABNORMAL
KETONES UR STRIP-MCNC: NEGATIVE MG/DL
LEUKOCYTE ESTERASE UR QL STRIP: NEGATIVE
N GONORRHOEA DNA SPEC QL NAA+PROBE: NEGATIVE
NITRATE UR QL: NEGATIVE
PH UR STRIP: 6 [PH] (ref 5–8)
RBC #/AREA URNS AUTO: ABNORMAL /HPF
SP GR UR STRIP: <=1.005 (ref 1–1.03)
SPECIMEN TYPE: NORMAL
SQUAMOUS #/AREA URNS AUTO: ABNORMAL /LPF
TRICHOMONAS, WET PREP: ABNORMAL
UROBILINOGEN UR STRIP-ACNC: 0.2 E.U./DL
WBC #/AREA URNS AUTO: ABNORMAL /HPF
WBC'S/HIGH POWER FIELD, WET PREP: ABNORMAL
YEAST, WET PREP: ABNORMAL

## 2025-07-30 PROCEDURE — 87591 N.GONORRHOEAE DNA AMP PROB: CPT

## 2025-07-30 PROCEDURE — 87210 SMEAR WET MOUNT SALINE/INK: CPT

## 2025-07-30 PROCEDURE — 3079F DIAST BP 80-89 MM HG: CPT

## 2025-07-30 PROCEDURE — 81001 URINALYSIS AUTO W/SCOPE: CPT

## 2025-07-30 PROCEDURE — 87491 CHLMYD TRACH DNA AMP PROBE: CPT

## 2025-07-30 PROCEDURE — 3075F SYST BP GE 130 - 139MM HG: CPT

## 2025-07-30 PROCEDURE — 99214 OFFICE O/P EST MOD 30 MIN: CPT

## 2025-07-30 RX ORDER — METRONIDAZOLE 7.5 MG/G
1 GEL VAGINAL DAILY
Qty: 25 G | Refills: 0 | Status: SHIPPED | OUTPATIENT
Start: 2025-07-30 | End: 2025-08-04

## 2025-07-30 NOTE — PROGRESS NOTES
URGENT CARE  Assessment & Plan   Assessment:   Demario Sierra is a 24 year old female who's clinical presentation today is consistent with:   1. Bacterial vaginosis   - metroNIDAZOLE (METROGEL) 0.75 % vaginal gel;    2. Increased frequency of urination  - UA Macroscopic with reflex to Microscopic and Culture -   - Chlamydia & Gonorrhea by PCR, GICH/Range - Clinic Collect  - Wet prep - Clinic Collect  Plan:  Will treat patient for bacterial vaginosis today; results of wet prep (as well as UA results) were independently reviewed by myself; prescription sent and side effects of medication reviewed.    No signs of a UTI today, G/C are pending and will come back tomorrow  Additionally we discussed if symptoms do not improve after starting today's treatment to follow up in 7-10 days, sooner if symptoms worsen, return precautions given    No alarm signs or symptoms present   Differential Diagnoses for this patient's chief complaint that I considered include:  UTI,  candidiasis, Vulvovaginitis, atrophic vaginitis, contact vs allergic vaginitis vs inflammatory or irritative  STD: gonorrhea, chlamydia, trichomoniasis; PID cervicitis, lichen planus, Malignancy (vaginal, ovarian, cervical)     F[facility]RG[facility] [place]      ______________________________________________________________________      Subjective     HPI: Demario Sierra  is a 24 year old  female who presents today for evaluation the following concerns:   Demario reports experiencing a weird sensation in her urethra after urinating, along with a slight feeling of needing to urinate again. These symptoms have been intermittent for about a month, but she only began to notice the discomfort more prominently on Tuesday, yesterday, one day ago. The sensation is present for a short period after urination and then subsides. Has issues with vaginal discharge, but no odor. Demario does not express significant concern about STDs, but would like testing  today.  Has had issues with yeast in the past. No flank pain, pelvic pain, no fevers reported     Review of Systems:  Pertinent review of systems as reflected in HPI, otherwise negative.     Objective    Physical Exam:  Vitals:    07/30/25 1001   BP: 135/85   Pulse: 95   Resp: 18   Temp: 98.4  F (36.9  C)   TempSrc: Oral   SpO2: 99%   Weight: 39.8 kg (87 lb 12.8 oz)      General: Alert and oriented, no acute distress, afebrile, normotensive  Psy/mental status: Nonanxious, cooperative  SKIN: Intact, no open areas  ABDOMEN:  soft, non-tender, non-distended    No flank pain or CVA tenderness   Pelvic/ :   Deferred    LABS:   Results for orders placed or performed in visit on 07/30/25   UA Macroscopic with reflex to Microscopic and Culture - Clinic Collect     Status: Abnormal    Specimen: Urine, Clean Catch   Result Value Ref Range    Color Urine Yellow Colorless, Straw, Light Yellow, Yellow    Appearance Urine Clear Clear    Glucose Urine Negative Negative mg/dL    Bilirubin Urine Negative Negative    Ketones Urine Negative Negative mg/dL    Specific Gravity Urine <=1.005 1.005 - 1.030    Blood Urine Trace (A) Negative    pH Urine 6.0 5.0 - 8.0    Protein Albumin Urine Negative Negative mg/dL    Urobilinogen Urine 0.2 0.2, 1.0 E.U./dL    Nitrite Urine Negative Negative    Leukocyte Esterase Urine Negative Negative   UA Microscopic with Reflex to Culture     Status: Abnormal   Result Value Ref Range    Bacteria Urine Few (A) None Seen /HPF    RBC Urine 0-2 0-2 /HPF /HPF    WBC Urine 0-5 0-5 /HPF /HPF    Squamous Epithelials Urine Few (A) None Seen /LPF    Narrative    Urine Culture not indicated   Wet prep - Clinic Collect     Status: Abnormal    Specimen: Vagina; Swab   Result Value Ref Range    Trichomonas Absent Absent    Yeast Absent Absent    Clue Cells Present (A) Absent    WBCs/high power field 2+ (A) None        ______________________________________________________________________    I explained my  diagnostic considerations and recommendations to the patient  All questions were answered.   Please see AVS for any patient instructions & handouts given.

## 2025-08-10 ENCOUNTER — HEALTH MAINTENANCE LETTER (OUTPATIENT)
Age: 25
End: 2025-08-10